# Patient Record
Sex: MALE | Race: WHITE | NOT HISPANIC OR LATINO | Employment: OTHER | URBAN - METROPOLITAN AREA
[De-identification: names, ages, dates, MRNs, and addresses within clinical notes are randomized per-mention and may not be internally consistent; named-entity substitution may affect disease eponyms.]

---

## 2021-11-17 ENCOUNTER — TELEPHONE (OUTPATIENT)
Dept: SURGERY | Facility: CLINIC | Age: 46
End: 2021-11-17
Payer: MEDICARE

## 2021-11-18 ENCOUNTER — TELEPHONE (OUTPATIENT)
Dept: SURGERY | Facility: CLINIC | Age: 46
End: 2021-11-18
Payer: MEDICARE

## 2021-11-23 ENCOUNTER — TELEPHONE (OUTPATIENT)
Dept: SURGERY | Facility: CLINIC | Age: 46
End: 2021-11-23
Payer: MEDICARE

## 2022-03-11 ENCOUNTER — TELEPHONE (OUTPATIENT)
Dept: SURGERY | Facility: CLINIC | Age: 47
End: 2022-03-11
Payer: MEDICARE

## 2022-03-11 NOTE — TELEPHONE ENCOUNTER
Spoke with patient's wife and appointment scheduled for potential ostomy reversal. Appointment details confirmed with patient's wife. Link to [facility] sent via text.

## 2022-03-11 NOTE — TELEPHONE ENCOUNTER
Spoke with wife and instructed to bring most recent CT imaging  disc with her.Wife  States that she will bring CT done in [place] with her to appointment.

## 2022-03-11 NOTE — TELEPHONE ENCOUNTER
----- Message from Maude Tai sent at 3/11/2022  2:01 PM CST -----  Regarding: appt  Contact: Rosanna (wife) @ [phone]  Caller asking to speak with someone in Dr. Lock's office regarding scheduling an appt for the patient, has new insurance ([employer]), please call.

## 2022-03-14 ENCOUNTER — TELEPHONE (OUTPATIENT)
Dept: SURGERY | Facility: CLINIC | Age: 47
End: 2022-03-14
Payer: MEDICARE

## 2022-03-14 NOTE — TELEPHONE ENCOUNTER
----- Message from Chaya Fields sent at 3/14/2022  9:20 AM CDT -----  Regarding: Appointment  Contact: Rosanna/wife [phone]  Calling to get an earlier appointment time on [id number]. Please call

## 2022-03-16 ENCOUNTER — TELEPHONE (OUTPATIENT)
Dept: SURGERY | Facility: CLINIC | Age: 47
End: 2022-03-16
Payer: MEDICARE

## 2022-03-18 ENCOUNTER — OFFICE VISIT (OUTPATIENT)
Dept: SURGERY | Facility: CLINIC | Age: 47
End: 2022-03-18
Payer: MEDICARE

## 2022-03-18 VITALS
HEIGHT: 75 IN | BODY MASS INDEX: 37.88 KG/M2 | HEART RATE: 68 BPM | DIASTOLIC BLOOD PRESSURE: 78 MMHG | SYSTOLIC BLOOD PRESSURE: 135 MMHG | WEIGHT: 304.69 LBS

## 2022-03-18 DIAGNOSIS — Z93.3 COLOSTOMY IN PLACE: ICD-10-CM

## 2022-03-18 DIAGNOSIS — Z85.048 HISTORY OF RECTAL CANCER: Primary | ICD-10-CM

## 2022-03-18 PROCEDURE — 99999 PR PBB SHADOW E&M-EST. PATIENT-LVL III: CPT | Mod: PBBFAC,,, | Performed by: COLON & RECTAL SURGERY

## 2022-03-18 PROCEDURE — 3078F DIAST BP <80 MM HG: CPT | Mod: CPTII,S$GLB,, | Performed by: COLON & RECTAL SURGERY

## 2022-03-18 PROCEDURE — 3075F PR MOST RECENT SYSTOLIC BLOOD PRESS GE 130-139MM HG: ICD-10-PCS | Mod: CPTII,S$GLB,, | Performed by: COLON & RECTAL SURGERY

## 2022-03-18 PROCEDURE — 3008F PR BODY MASS INDEX (BMI) DOCUMENTED: ICD-10-PCS | Mod: CPTII,S$GLB,, | Performed by: COLON & RECTAL SURGERY

## 2022-03-18 PROCEDURE — 99999 PR PBB SHADOW E&M-EST. PATIENT-LVL III: ICD-10-PCS | Mod: PBBFAC,,, | Performed by: COLON & RECTAL SURGERY

## 2022-03-18 PROCEDURE — 3078F PR MOST RECENT DIASTOLIC BLOOD PRESSURE < 80 MM HG: ICD-10-PCS | Mod: CPTII,S$GLB,, | Performed by: COLON & RECTAL SURGERY

## 2022-03-18 PROCEDURE — 99205 PR OFFICE/OUTPT VISIT, NEW, LEVL V, 60-74 MIN: ICD-10-PCS | Mod: S$GLB,,, | Performed by: COLON & RECTAL SURGERY

## 2022-03-18 PROCEDURE — 1160F RVW MEDS BY RX/DR IN RCRD: CPT | Mod: CPTII,S$GLB,, | Performed by: COLON & RECTAL SURGERY

## 2022-03-18 PROCEDURE — 1160F PR REVIEW ALL MEDS BY PRESCRIBER/CLIN PHARMACIST DOCUMENTED: ICD-10-PCS | Mod: CPTII,S$GLB,, | Performed by: COLON & RECTAL SURGERY

## 2022-03-18 PROCEDURE — 1159F MED LIST DOCD IN RCRD: CPT | Mod: CPTII,S$GLB,, | Performed by: COLON & RECTAL SURGERY

## 2022-03-18 PROCEDURE — 1159F PR MEDICATION LIST DOCUMENTED IN MEDICAL RECORD: ICD-10-PCS | Mod: CPTII,S$GLB,, | Performed by: COLON & RECTAL SURGERY

## 2022-03-18 PROCEDURE — 3008F BODY MASS INDEX DOCD: CPT | Mod: CPTII,S$GLB,, | Performed by: COLON & RECTAL SURGERY

## 2022-03-18 PROCEDURE — 3075F SYST BP GE 130 - 139MM HG: CPT | Mod: CPTII,S$GLB,, | Performed by: COLON & RECTAL SURGERY

## 2022-03-18 PROCEDURE — 99205 OFFICE O/P NEW HI 60 MIN: CPT | Mod: S$GLB,,, | Performed by: COLON & RECTAL SURGERY

## 2022-03-18 RX ORDER — PREGABALIN 75 MG/1
75 CAPSULE ORAL
COMMUNITY
Start: 2022-03-17 | End: 2023-03-17

## 2022-03-18 NOTE — PROGRESS NOTES
CRS Office Visit History and Physical    Referring Md:   Kolton Loredo Md  [address] [place] [address] [address]  [place] [id number]    SUBJECTIVE:     Chief Complaint: colostomy reversal    History of Present Illness:  The patient is a new patient to this practice.   Course is as follows:  Patient is a 47 y.o. male presents with colostomy  Diagnosed with rectal cancer in May 2018.  He initially presented with diarrhea.  He was obstructed at the time of his initial colonoscopy and was therefore treated with emergent laparoscopic loop colostomy.  He then underwent neoadjuvant chemo radiotherapy as well as systemic chemotherapy.  He was sent to the [facility] to have a low anterior resection performed.  Robotic low anterior resection was performed 10/2018.  At that time, anastomosis was not performed due to difficulty getting the colon to reach.  He was therefore given an end colostomy with a Saima stump.  He had additional adjuvant chemotherapy.    From a tumor standpoint, he has done well.  He is followed by [facility] for oncology.  Recent tumor markers and CT scans negative for metastatic or recurrent disease.  In March 2021, he developed peristomal hernia.  Laparoscopic parastomal hernia repair was performed.  At that time, his pelvis was noted to be scarred and the rectal stump unable to be identified.  Therefore, keyhole repair with a mesh was done laparoscopically.    He has a history of a prior laparoscopic gastric band with resultant 300 lb weight loss.  He has no open abdominal incisions.  No lower abdominal incisions.  His understanding of his low anterior resection was that the surgeon took out a proximally 5-8 inches of the rectum.    Historically, he had bowel movements twice per day.  No issues with fecal incontinence.  No tobacco abuse.  His weight has been stable over the past several months.    Review of patient's allergies indicates:  No Known Allergies    Past Medical History:   Diagnosis  "Date    Rectal cancer     T3N2M0     Past Surgical History:   Procedure Laterality Date    LAPAROSCOPIC GASTRIC BANDING  10/2009    PARASTOMAL HERNIA REPAIR  03/2021    ROBOT-ASSISTED LOW ANTERIOR RESECTION OF COLON       Family History   Problem Relation Age of Onset    Obesity Mother     Hypertension Father            Review of Systems:  Review of Systems   Constitutional: Negative for chills, diaphoresis, fever, malaise/fatigue and weight loss.   HENT: Negative for congestion.    Respiratory: Negative for shortness of breath.    Cardiovascular: Negative for chest pain and leg swelling.   Gastrointestinal: Negative for abdominal pain, blood in stool, constipation, nausea and vomiting.   Genitourinary: Negative for dysuria.   Musculoskeletal: Negative for back pain and myalgias.   Skin: Negative for rash.   Neurological: Positive for tingling. Negative for dizziness and weakness.   Endo/Heme/Allergies: Does not bruise/bleed easily.   Psychiatric/Behavioral: Negative for depression.       OBJECTIVE:     Vital Signs (Most Recent)  /78 (BP Location: Right arm, Patient Position: Sitting, BP Method: Large (Automatic))   Pulse 68   Ht 6' 3" (1.905 m)   Wt (!) 138.2 kg (304 lb 10.8 oz)   BMI 38.08 kg/m²     Physical Exam:  General: White male in no distress   Neuro: alert and oriented x 4.  Moves all extremities.     HEENT: no icterus.  Trachea midline  Respiratory: respirations are even and unlabored  Cardiac: regular rate  Abdomen:  Moderate pannus.  Ostomy in the left mid abdomen is healthy appearing.  No lower abdominal incisions.  Laparoscopic incisions have all healed well.  Extremities: Warm dry and intact.  No edema  Skin: no rashes  Anorectal:  External exam was normal.  Digital exam performed.  Normal tone.  Normal squeeze pressure.  Top of the stump was not palpated.    Labs:  H&H 16 and 50. Albumin 4.2.  Normal renal function.  CEA from 01/28/2022 <0.5    Imaging:   CT chest abdomen pelvis on " 02/16/2021 personally reviewed demonstrates parastomal hernia.  Rectal stump seen with staple line.  Small bowel loops on top of the rectal stump.  Splenic flexure does not appear to have been previously mobilized.  CT chest abdomen pelvis on 01/28/2022 personally reviewed demonstrates interval repair peristomal hernia.  Stoma is lateral to the rectus in the transversalis.  Stoma appears to be the descending colon.  Splenic flexure remains in its normal anatomic location.  Appears to have adequate length available with splenic flexure mobilization.      ASSESSMENT/PLAN:     Diagnoses and all orders for this visit:    History of rectal cancer  -     Case Request Operating Room: PROCTECTOMY, LAPAROSCOPIC, ERAS low, CLOSURE, COLOSTOMY, SIGMOIDOSCOPY, FLEXIBLE, CREATION, ILEOSTOMY, INSERTION, CATHETER, URETER    Colostomy in place  -     Case Request Operating Room: PROCTECTOMY, LAPAROSCOPIC, ERAS low, CLOSURE, COLOSTOMY, SIGMOIDOSCOPY, FLEXIBLE, CREATION, ILEOSTOMY, INSERTION, CATHETER, URETER        47 y.o. male with history of rectal cancer s/p robotic LAR with inability to get the colon to reach the pelvis resulting in Saima's procedure.  He presents for evaluation for ostomy takedown.  On exam, he has a 7-8 cm rectal stump that appears healthy.  His CT scan demonstrates small bowel loops on top of the rectal stump.  CT shows that he has a descending colostomy.  Splenic flexure appears in its normal anatomic location and could be completely mobilized to increase reach.  He previously was over 500 lb but has lost substantial weight following his gastric banding.  His weight in nutrition are stable.  Functionally, he is normal.  He appears to be a good candidate for ostomy reversal.  I am unclear what attempts were made during his initial resection that prevented anastomosis.    Technically, we will plan for bilateral ureteral catheters given the redo pelvic operation.  We could approach should using a Pfannenstiel  since he has no past lower abdominal incisions.  Through the Pfannenstiel, we will plan to dissect out the rectal stump.  Afterwards, complete mobilization of the splenic flexure would be necessary.  Spoke with him via telephone after reviewing the films.  He would like to proceed in early June.  Discussed with him the risks failure to reach for anastomosis, anastomotic leak, bleeding, hernia, damage to surrounding structure, need for ureteral catheters, 4-5 day postoperative stay and a 6 week total recovery.  Also discussed the temporary fecal diversion with a loop ileostomy would be necessary due to his history of radiation and low pelvic surgery.    We will plan to consent the morning of surgery as well as marked for an ileostomy since he lives 3 hours away.    He is planning on getting a colonoscopy next month.  He will send me the results.    Flexible sigmoidoscopy:  Flexible endoscope was inserted through the anus into the rectal stump.  The rectal stump appeared distensible and measured approximately 7-8 cm.  No sign intraluminal disease.  He tolerated the procedure well.      BROOKLYN Lock MD, FACS, FASCRS  Staff Surgeon  Colon & Rectal Surgery

## 2022-03-22 ENCOUNTER — TELEPHONE (OUTPATIENT)
Dept: SURGERY | Facility: CLINIC | Age: 47
End: 2022-03-22
Payer: MEDICARE

## 2022-03-22 ENCOUNTER — PATIENT MESSAGE (OUTPATIENT)
Dept: SURGERY | Facility: CLINIC | Age: 47
End: 2022-03-22
Payer: MEDICARE

## 2022-03-22 DIAGNOSIS — Z93.3 COLOSTOMY IN PLACE: Primary | ICD-10-CM

## 2022-03-22 RX ORDER — METRONIDAZOLE 500 MG/1
500 TABLET ORAL 3 TIMES DAILY
Qty: 3 TABLET | Refills: 0 | Status: SHIPPED | OUTPATIENT
Start: 2022-03-22 | End: 2022-03-23

## 2022-03-22 RX ORDER — NEOMYCIN SULFATE 500 MG/1
TABLET ORAL
Qty: 6 TABLET | Refills: 0 | Status: SHIPPED | OUTPATIENT
Start: 2022-03-22 | End: 2022-06-08

## 2022-03-22 RX ORDER — POLYETHYLENE GLYCOL 3350 17 G/17G
POWDER, FOR SOLUTION ORAL
Qty: 290 G | Refills: 0 | Status: SHIPPED | OUTPATIENT
Start: 2022-03-22 | End: 2022-06-08

## 2022-03-22 NOTE — TELEPHONE ENCOUNTER
Spoke with patient's wife and number to central pricing office provided per request. No other questions at this time.Wife states that they have received surgery instructions and will call for any clarification, if needed.

## 2022-03-22 NOTE — TELEPHONE ENCOUNTER
----- Message from BROOKLYN Lock MD sent at 3/19/2022  9:52 AM CDT -----  Hi!      I put him on for a colostomy takedown on June 6th.  He will need a full prep.  I also put in for bilateral ureteral catheters with urology.  Will plan to consents the morning of surgery as well as marked for stoma the morning of surgery since he lives 3 hours away.    THANK YOU!    Farhat

## 2022-03-22 NOTE — TELEPHONE ENCOUNTER
----- Message from Mat Sosa sent at 3/22/2022 12:27 PM CDT -----  Contact: Rosanna ( spouse ) @[phone]  Caller requesting a return phone call from Steph about paperwork requested, Please return call to discuss further

## 2022-03-22 NOTE — TELEPHONE ENCOUNTER
Surgery Instructions emailed to [email or web address].  Message sent via portal.

## 2022-03-23 RX ORDER — SODIUM CHLORIDE 9 MG/ML
INJECTION, SOLUTION INTRAVENOUS CONTINUOUS
Status: CANCELLED | OUTPATIENT
Start: 2022-03-23

## 2022-03-23 RX ORDER — GABAPENTIN 300 MG/1
300 CAPSULE ORAL 3 TIMES DAILY
Status: CANCELLED | OUTPATIENT
Start: 2022-03-23

## 2022-04-13 ENCOUNTER — PATIENT MESSAGE (OUTPATIENT)
Dept: SURGERY | Facility: CLINIC | Age: 47
End: 2022-04-13
Payer: MEDICARE

## 2022-05-17 ENCOUNTER — DOCUMENTATION ONLY (OUTPATIENT)
Dept: HEMATOLOGY/ONCOLOGY | Facility: CLINIC | Age: 47
End: 2022-05-17
Payer: MEDICARE

## 2022-05-17 NOTE — PROGRESS NOTES
Social Work Consult    Name:Art Carrillo  : 1975  MRN: [id number]    Referral:[facility]     SW received consult from Steph Ramirez RN via Anatoliy Gonzalez LCSW. Hotel stay needed for upcoming surgery.    SW spoke to patients wife, Rosanna, [phone]. Prior to surgery () patient will need to drink cleansing drink and cannot travel. Rosanna requested stay from 22 until 6/10. Rosanna reports she will pay privately if longer stay is needed but would like to use [employer] for the 6 planned nights.    SW submit request for stay.

## 2022-05-18 ENCOUNTER — DOCUMENTATION ONLY (OUTPATIENT)
Dept: HEMATOLOGY/ONCOLOGY | Facility: CLINIC | Age: 47
End: 2022-05-18
Payer: MEDICARE

## 2022-05-18 ENCOUNTER — TELEPHONE (OUTPATIENT)
Dept: SURGERY | Facility: CLINIC | Age: 47
End: 2022-05-18
Payer: MEDICARE

## 2022-05-18 NOTE — PROGRESS NOTES
JORDYN received [facility] confirmation for the patients family stay from 6/4 until 6/10. Confirmation# [id number].    SW called and spoke to patients wife, Rosanna. Confirmation number provided, answered questions about dining options.     Rosanna had question about how to pay for procedure. JORDYN recommended calling MD office.

## 2022-05-18 NOTE — TELEPHONE ENCOUNTER
Spoke with wife regarding emailing color copies of most recent colonoscopy. Wife confirmed that hotel accommodations were set up. No further needs at this time.

## 2022-05-18 NOTE — TELEPHONE ENCOUNTER
----- Message from Stephen Thompson LCSW sent at 5/18/2022 12:03 PM CDT -----  All set. [facility] has been arranged for 6/4 - 6/10  ----- Message -----  From: Иван Gonzalez LCSW  Sent: 5/17/2022  10:00 AM CDT  To: Stephen Thompson LCSW    Surg Onc patient with qualifying rectal cancer diagnosis; let me know if you need assistance,  Иван  ----- Message -----  From: Steph Ramirez RN  Sent: 5/17/2022   8:35 AM CDT  To: Иван Gonzalez LCSW    Good morning,    Could you possibly help out this patient with hotel accommodations?    He is scheduled for surgery June 6 for rectal cancer surgery.    Thanks,  Steph

## 2022-06-03 ENCOUNTER — TELEPHONE (OUTPATIENT)
Dept: SURGERY | Facility: CLINIC | Age: 47
End: 2022-06-03
Payer: MEDICARE

## 2022-06-03 NOTE — TELEPHONE ENCOUNTER
----- Message from Rosanna Carrillo, Patient Care Assistant sent at 6/3/2022  2:27 PM CDT -----  Regarding: procedure time  Contact: Pt  Pt is requesting a call back in regards to time for his procedure. Pt states she doesn't know what time he needs to be there and       Pt @ [phone] or [phone]

## 2022-06-03 NOTE — PRE-PROCEDURE INSTRUCTIONS
PREOP INSTRUCTIONS:  No food,milk or milk products for 8 hours before surgery.  Clear liquids like water,gatorade,apple juice are allowed up until 2 hours before surgery.  Instructed to follow the surgeon's instructions if they differ from these.  Shower instructions as well as directions to the Surgery Center were given.  Encouraged to wear loose fitting,comfortable clothing.  Medication instructions for pm prior to and am of procedure reviewed.  Instructed to avoid taking vitamins,supplements,aspirin and ibuprofen the morning of surgery.    Patient denies any side effects or issues with anesthesia or sedation other than PONV    Patient does not know arrival time.Explained that this information comes from the surgeon's office and if they haven't heard from them by 3 pm to call the office.Patient stated an understanding.

## 2022-06-06 ENCOUNTER — HOSPITAL ENCOUNTER (INPATIENT)
Facility: HOSPITAL | Age: 47
LOS: 1 days | Discharge: HOME | DRG: 393 | End: 2022-06-06
Attending: COLON & RECTAL SURGERY | Admitting: COLON & RECTAL SURGERY
Payer: MEDICARE

## 2022-06-06 ENCOUNTER — PATIENT MESSAGE (OUTPATIENT)
Dept: SURGERY | Facility: HOSPITAL | Age: 47
End: 2022-06-06
Payer: MEDICARE

## 2022-06-06 VITALS
SYSTOLIC BLOOD PRESSURE: 121 MMHG | BODY MASS INDEX: 37.05 KG/M2 | WEIGHT: 298 LBS | OXYGEN SATURATION: 97 % | TEMPERATURE: 98 F | DIASTOLIC BLOOD PRESSURE: 78 MMHG | HEART RATE: 74 BPM | RESPIRATION RATE: 20 BRPM | HEIGHT: 75 IN

## 2022-06-06 DIAGNOSIS — Z93.3 COLOSTOMY IN PLACE: ICD-10-CM

## 2022-06-06 DIAGNOSIS — Z85.048 HISTORY OF RECTAL CANCER: Primary | ICD-10-CM

## 2022-06-06 DIAGNOSIS — C20 RECTAL CANCER: Primary | ICD-10-CM

## 2022-06-06 LAB
CTP QC/QA: YES
SARS-COV-2 AG RESP QL IA.RAPID: POSITIVE

## 2022-06-06 PROCEDURE — 99499 UNLISTED E&M SERVICE: CPT | Mod: ,,, | Performed by: COLON & RECTAL SURGERY

## 2022-06-06 PROCEDURE — 63600175 PHARM REV CODE 636 W HCPCS: Performed by: NURSE PRACTITIONER

## 2022-06-06 PROCEDURE — 12000002 HC ACUTE/MED SURGE SEMI-PRIVATE ROOM

## 2022-06-06 PROCEDURE — 25000003 PHARM REV CODE 250: Performed by: NURSE PRACTITIONER

## 2022-06-06 PROCEDURE — 99499 NO LOS: ICD-10-PCS | Mod: ,,, | Performed by: COLON & RECTAL SURGERY

## 2022-06-06 RX ORDER — ACETAMINOPHEN 650 MG/20.3ML
975 LIQUID ORAL
Status: COMPLETED | OUTPATIENT
Start: 2022-06-06 | End: 2022-06-06

## 2022-06-06 RX ORDER — SODIUM CHLORIDE 9 MG/ML
INJECTION, SOLUTION INTRAVENOUS
Status: ACTIVE | OUTPATIENT
Start: 2022-06-06

## 2022-06-06 RX ORDER — GABAPENTIN 300 MG/1
300 CAPSULE ORAL
Status: COMPLETED | OUTPATIENT
Start: 2022-06-06 | End: 2022-06-06

## 2022-06-06 RX ORDER — MUPIROCIN 20 MG/G
1 OINTMENT TOPICAL
Status: COMPLETED | OUTPATIENT
Start: 2022-06-06 | End: 2022-06-06

## 2022-06-06 RX ORDER — METRONIDAZOLE 500 MG/100ML
500 INJECTION, SOLUTION INTRAVENOUS
Status: COMPLETED | OUTPATIENT
Start: 2022-06-06 | End: 2022-06-27

## 2022-06-06 RX ORDER — TRIPROLIDINE/PSEUDOEPHEDRINE 2.5MG-60MG
600 TABLET ORAL
Status: COMPLETED | OUTPATIENT
Start: 2022-06-06 | End: 2022-06-06

## 2022-06-06 RX ORDER — LIDOCAINE HYDROCHLORIDE 10 MG/ML
1 INJECTION, SOLUTION EPIDURAL; INFILTRATION; INTRACAUDAL; PERINEURAL
Status: ACTIVE | OUTPATIENT
Start: 2022-06-06

## 2022-06-06 RX ORDER — HEPARIN SODIUM 5000 [USP'U]/ML
5000 INJECTION, SOLUTION INTRAVENOUS; SUBCUTANEOUS EVERY 8 HOURS
Status: COMPLETED | OUTPATIENT
Start: 2022-06-06 | End: 2022-06-06

## 2022-06-06 RX ADMIN — ACETAMINOPHEN 976.6 MG: 160 SOLUTION ORAL at 09:06

## 2022-06-06 RX ADMIN — GABAPENTIN 300 MG: 300 CAPSULE ORAL at 09:06

## 2022-06-06 RX ADMIN — HEPARIN SODIUM 5000 UNITS: 5000 INJECTION INTRAVENOUS; SUBCUTANEOUS at 09:06

## 2022-06-06 RX ADMIN — MUPIROCIN 1 G: 20 OINTMENT TOPICAL at 09:06

## 2022-06-06 RX ADMIN — IBUPROFEN 600 MG: 100 SUSPENSION ORAL at 09:06

## 2022-06-06 NOTE — H&P (VIEW-ONLY)
CRS History and Physical    Referring Md:   BROOKLYN Lock Md  [address] [place] [address]    SUBJECTIVE:     Chief Complaint: colostomy reversal    History of Present Illness:  The patient is a new patient to this practice.   Course is as follows:  Patient is a 47 y.o. male presents with colostomy  Diagnosed with rectal cancer in May 2018.  He initially presented with diarrhea.  He was obstructed at the time of his initial colonoscopy and was therefore treated with emergent laparoscopic loop colostomy.  He then underwent neoadjuvant chemo radiotherapy as well as systemic chemotherapy.  He was sent to the [facility] to have a low anterior resection performed.  Robotic low anterior resection was performed 10/2018.  At that time, anastomosis was not performed due to difficulty getting the colon to reach.  He was therefore given an end colostomy with a Saima stump.  He had additional adjuvant chemotherapy.    From a tumor standpoint, he has done well.  He is followed by [facility] for oncology.  Recent tumor markers and CT scans negative for metastatic or recurrent disease.  In March 2021, he developed peristomal hernia.  Laparoscopic parastomal hernia repair was performed.  At that time, his pelvis was noted to be scarred and the rectal stump unable to be identified.  Therefore, keyhole repair with a mesh was done laparoscopically.    He has a history of a prior laparoscopic gastric band with resultant 300 lb weight loss.  He has no open abdominal incisions.  No lower abdominal incisions.  His understanding of his low anterior resection was that the surgeon took out a proximally 5-8 inches of the rectum.    Historically, he had bowel movements twice per day.  No issues with fecal incontinence.  No tobacco abuse.  His weight has been stable over the past several months.    Review of patient's allergies indicates:  No Known Allergies    Past Medical History:   Diagnosis Date    Rectal  "cancer     T3N2M0     Past Surgical History:   Procedure Laterality Date    LAPAROSCOPIC GASTRIC BANDING  10/2009    PARASTOMAL HERNIA REPAIR  03/2021    ROBOT-ASSISTED LOW ANTERIOR RESECTION OF COLON       Family History   Problem Relation Age of Onset    Obesity Mother     Hypertension Father      Social History     Tobacco Use    Smoking status: Never Smoker    Smokeless tobacco: Never Used        Review of Systems:  Review of Systems   Constitutional: Negative for chills, diaphoresis, fever, malaise/fatigue and weight loss.   HENT: Negative for congestion.    Respiratory: Negative for shortness of breath.    Cardiovascular: Negative for chest pain and leg swelling.   Gastrointestinal: Negative for abdominal pain, blood in stool, constipation, nausea and vomiting.   Genitourinary: Negative for dysuria.   Musculoskeletal: Negative for back pain and myalgias.   Skin: Negative for rash.   Neurological: Positive for tingling. Negative for dizziness and weakness.   Endo/Heme/Allergies: Does not bruise/bleed easily.   Psychiatric/Behavioral: Negative for depression.       OBJECTIVE:     Vital Signs (Most Recent)  /78 (BP Location: Left arm, Patient Position: Lying)   Pulse 74   Temp 97.6 °F (36.4 °C) (Oral)   Resp 20   Ht 6' 3" (1.905 m)   Wt 135.2 kg (298 lb)   SpO2 97%   BMI 37.25 kg/m²     Physical Exam:  General: White male in no distress   Neuro: alert and oriented x 4.  Moves all extremities.     HEENT: no icterus.  Trachea midline  Respiratory: respirations are even and unlabored  Cardiac: regular rate  Abdomen:  Moderate pannus.  Ostomy in the left mid abdomen is healthy appearing.  No lower abdominal incisions.  Laparoscopic incisions have all healed well.  Extremities: Warm dry and intact.  No edema  Skin: no rashes  Anorectal:  External exam was normal.  Digital exam performed.  Normal tone.  Normal squeeze pressure.  Top of the stump was not palpated.    Labs:  H&H 16 and 50. Albumin " 4.2.  Normal renal function.  CEA from 01/28/2022 <0.5    Imaging:   CT chest abdomen pelvis on 02/16/2021 personally reviewed demonstrates parastomal hernia.  Rectal stump seen with staple line.  Small bowel loops on top of the rectal stump.  Splenic flexure does not appear to have been previously mobilized.  CT chest abdomen pelvis on 01/28/2022 personally reviewed demonstrates interval repair peristomal hernia.  Stoma is lateral to the rectus in the transversalis.  Stoma appears to be the descending colon.  Splenic flexure remains in its normal anatomic location.  Appears to have adequate length available with splenic flexure mobilization.      ASSESSMENT/PLAN:     Diagnoses and all orders for this visit:    History of rectal cancer  -     Case Request Operating Room: PROCTECTOMY, LAPAROSCOPIC, ERAS low, CLOSURE, COLOSTOMY, SIGMOIDOSCOPY, FLEXIBLE, CREATION, ILEOSTOMY, INSERTION, CATHETER, URETER    Colostomy in place  -     Case Request Operating Room: PROCTECTOMY, LAPAROSCOPIC, ERAS low, CLOSURE, COLOSTOMY, SIGMOIDOSCOPY, FLEXIBLE, CREATION, ILEOSTOMY, INSERTION, CATHETER, URETER        47 y.o. male with history of rectal cancer s/p robotic LAR with inability to get the colon to reach the pelvis resulting in Saima's procedure.  He presents for evaluation for ostomy takedown.  On exam, he has a 7-8 cm rectal stump that appears healthy.  His CT scan demonstrates small bowel loops on top of the rectal stump.  CT shows that he has a descending colostomy.  Splenic flexure appears in its normal anatomic location and could be completely mobilized to increase reach.  He previously was over 500 lb but has lost substantial weight following his gastric banding.  His weight in nutrition are stable.  Functionally, he is normal.  He appears to be a good candidate for ostomy reversal.  I am unclear what attempts were made during his initial resection that prevented anastomosis.    Technically, we will plan for bilateral  ureteral catheters given the redo pelvic operation.  We could approach should using a Pfannenstiel since he has no past lower abdominal incisions.  Through the Pfannenstiel, we will plan to dissect out the rectal stump.  Afterwards, complete mobilization of the splenic flexure would be necessary.  Spoke with him via telephone after reviewing the films.  He would like to proceed in early June.  Discussed with him the risks failure to reach for anastomosis, anastomotic leak, bleeding, hernia, damage to surrounding structure, need for ureteral catheters, 4-5 day postoperative stay and a 6 week total recovery.  Also discussed the temporary fecal diversion with a loop ileostomy would be necessary due to his history of radiation and low pelvic surgery.    We will plan to consent the morning of surgery as well as marked for an ileostomy since he lives 3 hours away.    He is planning on getting a colonoscopy next month.  He will send me the results.    Flexible sigmoidoscopy:  Flexible endoscope was inserted through the anus into the rectal stump.  The rectal stump appeared distensible and measured approximately 7-8 cm.  No sign intraluminal disease.  He tolerated the procedure well.      BROOKLYN Lock MD, FACS, FASCRS  Staff Surgeon  Colon & Rectal Surgery

## 2022-06-06 NOTE — H&P
CRS History and Physical    Referring Md:   BROOKLYN Lock Md  [address] [place] [address]    SUBJECTIVE:     Chief Complaint: colostomy reversal    History of Present Illness:  The patient is a new patient to this practice.   Course is as follows:  Patient is a 47 y.o. male presents with colostomy  Diagnosed with rectal cancer in May 2018.  He initially presented with diarrhea.  He was obstructed at the time of his initial colonoscopy and was therefore treated with emergent laparoscopic loop colostomy.  He then underwent neoadjuvant chemo radiotherapy as well as systemic chemotherapy.  He was sent to the [facility] to have a low anterior resection performed.  Robotic low anterior resection was performed 10/2018.  At that time, anastomosis was not performed due to difficulty getting the colon to reach.  He was therefore given an end colostomy with a Saima stump.  He had additional adjuvant chemotherapy.    From a tumor standpoint, he has done well.  He is followed by [facility] for oncology.  Recent tumor markers and CT scans negative for metastatic or recurrent disease.  In March 2021, he developed peristomal hernia.  Laparoscopic parastomal hernia repair was performed.  At that time, his pelvis was noted to be scarred and the rectal stump unable to be identified.  Therefore, keyhole repair with a mesh was done laparoscopically.    He has a history of a prior laparoscopic gastric band with resultant 300 lb weight loss.  He has no open abdominal incisions.  No lower abdominal incisions.  His understanding of his low anterior resection was that the surgeon took out a proximally 5-8 inches of the rectum.    Historically, he had bowel movements twice per day.  No issues with fecal incontinence.  No tobacco abuse.  His weight has been stable over the past several months.    Review of patient's allergies indicates:  No Known Allergies    Past Medical History:   Diagnosis Date    Rectal  "cancer     [id number]     Past Surgical History:   Procedure Laterality Date    LAPAROSCOPIC GASTRIC BANDING  10/2009    PARASTOMAL HERNIA REPAIR  03/2021    ROBOT-ASSISTED LOW ANTERIOR RESECTION OF COLON       Family History   Problem Relation Age of Onset    Obesity Mother     Hypertension Father      Social History     Tobacco Use    Smoking status: Never Smoker    Smokeless tobacco: Never Used        Review of Systems:  Review of Systems   Constitutional: Negative for chills, diaphoresis, fever, malaise/fatigue and weight loss.   HENT: Negative for congestion.    Respiratory: Negative for shortness of breath.    Cardiovascular: Negative for chest pain and leg swelling.   Gastrointestinal: Negative for abdominal pain, blood in stool, constipation, nausea and vomiting.   Genitourinary: Negative for dysuria.   Musculoskeletal: Negative for back pain and myalgias.   Skin: Negative for rash.   Neurological: Positive for tingling. Negative for dizziness and weakness.   Endo/Heme/Allergies: Does not bruise/bleed easily.   Psychiatric/Behavioral: Negative for depression.       OBJECTIVE:     Vital Signs (Most Recent)  /78 (BP Location: Left arm, Patient Position: Lying)   Pulse 74   Temp 97.6 °F (36.4 °C) (Oral)   Resp 20   Ht 6' 3" (1.905 m)   Wt 135.2 kg (298 lb)   SpO2 97%   BMI 37.25 kg/m²     Physical Exam:  General: White male in no distress   Neuro: alert and oriented x 4.  Moves all extremities.     HEENT: no icterus.  Trachea midline  Respiratory: respirations are even and unlabored  Cardiac: regular rate  Abdomen:  Moderate pannus.  Ostomy in the left mid abdomen is healthy appearing.  No lower abdominal incisions.  Laparoscopic incisions have all healed well.  Extremities: Warm dry and intact.  No edema  Skin: no rashes  Anorectal:  External exam was normal.  Digital exam performed.  Normal tone.  Normal squeeze pressure.  Top of the stump was not palpated.    Labs:  H&H 16 and 50. Albumin " 4.2.  Normal renal function.  CEA from 01/28/2022 <0.5    Imaging:   CT chest abdomen pelvis on 02/16/2021 personally reviewed demonstrates parastomal hernia.  Rectal stump seen with staple line.  Small bowel loops on top of the rectal stump.  Splenic flexure does not appear to have been previously mobilized.  CT chest abdomen pelvis on 01/28/2022 personally reviewed demonstrates interval repair peristomal hernia.  Stoma is lateral to the rectus in the transversalis.  Stoma appears to be the descending colon.  Splenic flexure remains in its normal anatomic location.  Appears to have adequate length available with splenic flexure mobilization.      ASSESSMENT/PLAN:     Diagnoses and all orders for this visit:    History of rectal cancer  -     Case Request Operating Room: PROCTECTOMY, LAPAROSCOPIC, ERAS low, CLOSURE, COLOSTOMY, SIGMOIDOSCOPY, FLEXIBLE, CREATION, ILEOSTOMY, INSERTION, CATHETER, URETER    Colostomy in place  -     Case Request Operating Room: PROCTECTOMY, LAPAROSCOPIC, ERAS low, CLOSURE, COLOSTOMY, SIGMOIDOSCOPY, FLEXIBLE, CREATION, ILEOSTOMY, INSERTION, CATHETER, URETER        47 y.o. male with history of rectal cancer s/p robotic LAR with inability to get the colon to reach the pelvis resulting in Saima's procedure.  He presents for evaluation for ostomy takedown.  On exam, he has a 7-8 cm rectal stump that appears healthy.  His CT scan demonstrates small bowel loops on top of the rectal stump.  CT shows that he has a descending colostomy.  Splenic flexure appears in its normal anatomic location and could be completely mobilized to increase reach.  He previously was over 500 lb but has lost substantial weight following his gastric banding.  His weight in nutrition are stable.  Functionally, he is normal.  He appears to be a good candidate for ostomy reversal.  I am unclear what attempts were made during his initial resection that prevented anastomosis.    Technically, we will plan for bilateral  ureteral catheters given the redo pelvic operation.  We could approach should using a Pfannenstiel since he has no past lower abdominal incisions.  Through the Pfannenstiel, we will plan to dissect out the rectal stump.  Afterwards, complete mobilization of the splenic flexure would be necessary.  Spoke with him via telephone after reviewing the films.  He would like to proceed in early June.  Discussed with him the risks failure to reach for anastomosis, anastomotic leak, bleeding, hernia, damage to surrounding structure, need for ureteral catheters, 4-5 day postoperative stay and a 6 week total recovery.  Also discussed the temporary fecal diversion with a loop ileostomy would be necessary due to his history of radiation and low pelvic surgery.    We will plan to consent the morning of surgery as well as marked for an ileostomy since he lives 3 hours away.    He is planning on getting a colonoscopy next month.  He will send me the results.    Flexible sigmoidoscopy:  Flexible endoscope was inserted through the anus into the rectal stump.  The rectal stump appeared distensible and measured approximately 7-8 cm.  No sign intraluminal disease.  He tolerated the procedure well.      BROOKLYN Lock MD, FACS, FASCRS  Staff Surgeon  Colon & Rectal Surgery

## 2022-06-06 NOTE — PLAN OF CARE
Preoperative COVID testing positive. Case cancelled. Will plan to reschedule case at the end of this month June 27. Informed consent for procedure, blood obtained in preop. Will scan into chart for surgery date.

## 2022-06-06 NOTE — PROGRESS NOTES
POCT COVID at bedside with positive results. Patient was asymptomatic. Surgery team was notified at the bedside. Case cancelled and rescheduled per MD. IV removed. Wife at bedside and aware of situation. Encouraged patient to call clinic if symptoms arise.

## 2022-06-08 ENCOUNTER — DOCUMENTATION ONLY (OUTPATIENT)
Dept: ONCOLOGY | Facility: HOSPITAL | Age: 47
End: 2022-06-08
Payer: MEDICARE

## 2022-06-08 DIAGNOSIS — Z01.818 PREOP EXAMINATION: Primary | ICD-10-CM

## 2022-06-08 DIAGNOSIS — Z85.048 HISTORY OF RECTAL CANCER: ICD-10-CM

## 2022-06-08 RX ORDER — METRONIDAZOLE 500 MG/1
500 TABLET ORAL 3 TIMES DAILY
Qty: 3 TABLET | Refills: 0 | Status: SHIPPED | OUTPATIENT
Start: 2022-06-08 | End: 2022-06-09

## 2022-06-08 RX ORDER — POLYETHYLENE GLYCOL 3350 17 G/17G
POWDER, FOR SOLUTION ORAL
Qty: 290 G | Refills: 0 | Status: ON HOLD | OUTPATIENT
Start: 2022-06-08 | End: 2022-06-29 | Stop reason: HOSPADM

## 2022-06-08 RX ORDER — NEOMYCIN SULFATE 500 MG/1
TABLET ORAL
Qty: 6 TABLET | Refills: 0 | Status: ON HOLD | OUTPATIENT
Start: 2022-06-08 | End: 2022-06-29 | Stop reason: HOSPADM

## 2022-06-08 NOTE — PROGRESS NOTES
Social Work Consult    Name:Art Carrillo  : 1975  MRN: [id number]    Referral:Toby COBB received consult from Steph Ramirez RN via Иван Gonzalez LCSW. Patient diagnosed with Covid and had to postpone procedure until the , lodging would need to be updated.     JORDYN spoke to patients wife, Rosanna at [phone]. She confirmed new request. Thankfully patient is asymptomatic.     JORDYN adjusted previous housing request and sent to [email or web address]

## 2022-06-23 ENCOUNTER — TELEPHONE (OUTPATIENT)
Dept: SURGERY | Facility: CLINIC | Age: 47
End: 2022-06-23
Payer: MEDICARE

## 2022-06-27 ENCOUNTER — ANESTHESIA EVENT (OUTPATIENT)
Dept: SURGERY | Facility: HOSPITAL | Age: 47
DRG: 331 | End: 2022-06-27
Payer: MEDICARE

## 2022-06-27 ENCOUNTER — ANESTHESIA (OUTPATIENT)
Dept: SURGERY | Facility: HOSPITAL | Age: 47
DRG: 331 | End: 2022-06-27
Payer: MEDICARE

## 2022-06-27 ENCOUNTER — HOSPITAL ENCOUNTER (INPATIENT)
Facility: HOSPITAL | Age: 47
LOS: 2 days | Discharge: HOME | DRG: 331 | End: 2022-06-29
Attending: COLON & RECTAL SURGERY | Admitting: COLON & RECTAL SURGERY
Payer: MEDICARE

## 2022-06-27 DIAGNOSIS — C20 RECTAL CANCER: Primary | ICD-10-CM

## 2022-06-27 LAB
ABO + RH BLD: NORMAL
ANION GAP SERPL CALC-SCNC: 11 MMOL/L (ref 8–16)
BASOPHILS # BLD AUTO: 0.02 K/UL (ref 0–0.2)
BASOPHILS NFR BLD: 0.1 % (ref 0–1.9)
BLD GP AB SCN CELLS X3 SERPL QL: NORMAL
BUN SERPL-MCNC: 12 MG/DL (ref 6–20)
CALCIUM SERPL-MCNC: 7.9 MG/DL (ref 8.7–10.5)
CHLORIDE SERPL-SCNC: 107 MMOL/L (ref 95–110)
CO2 SERPL-SCNC: 20 MMOL/L (ref 23–29)
CREAT SERPL-MCNC: 1.4 MG/DL (ref 0.5–1.4)
DIFFERENTIAL METHOD: ABNORMAL
EOSINOPHIL # BLD AUTO: 0 K/UL (ref 0–0.5)
EOSINOPHIL NFR BLD: 0 % (ref 0–8)
ERYTHROCYTE [DISTWIDTH] IN BLOOD BY AUTOMATED COUNT: 14 % (ref 11.5–14.5)
EST. GFR  (AFRICAN AMERICAN): >60 ML/MIN/1.73 M^2
EST. GFR  (NON AFRICAN AMERICAN): 59.4 ML/MIN/1.73 M^2
GLUCOSE SERPL-MCNC: 154 MG/DL (ref 70–110)
HCT VFR BLD AUTO: 48.8 % (ref 40–54)
HGB BLD-MCNC: 15.9 G/DL (ref 14–18)
IMM GRANULOCYTES # BLD AUTO: 0.1 K/UL (ref 0–0.04)
IMM GRANULOCYTES NFR BLD AUTO: 0.7 % (ref 0–0.5)
LYMPHOCYTES # BLD AUTO: 0.4 K/UL (ref 1–4.8)
LYMPHOCYTES NFR BLD: 3.2 % (ref 18–48)
MAGNESIUM SERPL-MCNC: 2 MG/DL (ref 1.6–2.6)
MCH RBC QN AUTO: 28.3 PG (ref 27–31)
MCHC RBC AUTO-ENTMCNC: 32.6 G/DL (ref 32–36)
MCV RBC AUTO: 87 FL (ref 82–98)
MONOCYTES # BLD AUTO: 0.4 K/UL (ref 0.3–1)
MONOCYTES NFR BLD: 3.1 % (ref 4–15)
NEUTROPHILS # BLD AUTO: 12.9 K/UL (ref 1.8–7.7)
NEUTROPHILS NFR BLD: 92.9 % (ref 38–73)
NRBC BLD-RTO: 0 /100 WBC
PHOSPHATE SERPL-MCNC: 2.9 MG/DL (ref 2.7–4.5)
PLATELET # BLD AUTO: 560 K/UL (ref 150–450)
PMV BLD AUTO: 8.8 FL (ref 9.2–12.9)
POTASSIUM SERPL-SCNC: 4.4 MMOL/L (ref 3.5–5.1)
RBC # BLD AUTO: 5.61 M/UL (ref 4.6–6.2)
SODIUM SERPL-SCNC: 138 MMOL/L (ref 136–145)
WBC # BLD AUTO: 13.84 K/UL (ref 3.9–12.7)

## 2022-06-27 PROCEDURE — 63600175 PHARM REV CODE 636 W HCPCS: Performed by: STUDENT IN AN ORGANIZED HEALTH CARE EDUCATION/TRAINING PROGRAM

## 2022-06-27 PROCEDURE — 27201423 OPTIME MED/SURG SUP & DEVICES STERILE SUPPLY: Performed by: COLON & RECTAL SURGERY

## 2022-06-27 PROCEDURE — 25000003 PHARM REV CODE 250: Performed by: ANESTHESIOLOGY

## 2022-06-27 PROCEDURE — 71000016 HC POSTOP RECOV ADDL HR: Performed by: COLON & RECTAL SURGERY

## 2022-06-27 PROCEDURE — 88304 TISSUE EXAM BY PATHOLOGIST: CPT | Performed by: PATHOLOGY

## 2022-06-27 PROCEDURE — 80048 BASIC METABOLIC PNL TOTAL CA: CPT | Performed by: STUDENT IN AN ORGANIZED HEALTH CARE EDUCATION/TRAINING PROGRAM

## 2022-06-27 PROCEDURE — 88304 PR  SURG PATH,LEVEL III: ICD-10-PCS | Mod: 26,,, | Performed by: PATHOLOGY

## 2022-06-27 PROCEDURE — 86901 BLOOD TYPING SEROLOGIC RH(D): CPT | Performed by: NURSE PRACTITIONER

## 2022-06-27 PROCEDURE — 84100 ASSAY OF PHOSPHORUS: CPT | Performed by: STUDENT IN AN ORGANIZED HEALTH CARE EDUCATION/TRAINING PROGRAM

## 2022-06-27 PROCEDURE — 88307 PR  SURG PATH,LEVEL V: ICD-10-PCS | Mod: 26,,, | Performed by: PATHOLOGY

## 2022-06-27 PROCEDURE — 71000033 HC RECOVERY, INTIAL HOUR: Performed by: COLON & RECTAL SURGERY

## 2022-06-27 PROCEDURE — S0030 INJECTION, METRONIDAZOLE: HCPCS | Performed by: NURSE PRACTITIONER

## 2022-06-27 PROCEDURE — 88307 TISSUE EXAM BY PATHOLOGIST: CPT | Mod: 26,,, | Performed by: PATHOLOGY

## 2022-06-27 PROCEDURE — 88304 TISSUE EXAM BY PATHOLOGIST: CPT | Mod: 26,,, | Performed by: PATHOLOGY

## 2022-06-27 PROCEDURE — 25000003 PHARM REV CODE 250: Performed by: STUDENT IN AN ORGANIZED HEALTH CARE EDUCATION/TRAINING PROGRAM

## 2022-06-27 PROCEDURE — 63600175 PHARM REV CODE 636 W HCPCS: Performed by: NURSE PRACTITIONER

## 2022-06-27 PROCEDURE — 37000009 HC ANESTHESIA EA ADD 15 MINS: Performed by: COLON & RECTAL SURGERY

## 2022-06-27 PROCEDURE — 63600175 PHARM REV CODE 636 W HCPCS: Performed by: NURSE ANESTHETIST, CERTIFIED REGISTERED

## 2022-06-27 PROCEDURE — 88305 TISSUE EXAM BY PATHOLOGIST: CPT | Mod: 59 | Performed by: PATHOLOGY

## 2022-06-27 PROCEDURE — 44227 PR LAP, SURG CLOSE ENTEROSTOMY RESECT ANAST: ICD-10-PCS | Mod: ,,, | Performed by: COLON & RECTAL SURGERY

## 2022-06-27 PROCEDURE — C1729 CATH, DRAINAGE: HCPCS | Performed by: COLON & RECTAL SURGERY

## 2022-06-27 PROCEDURE — 52005 PR CYSTOURETHROSCOPY,URETER CATHETER: ICD-10-PCS | Mod: ,,, | Performed by: UROLOGY

## 2022-06-27 PROCEDURE — 44187 LAP ILEO/JEJUNO-STOMY: CPT | Mod: 51,,, | Performed by: COLON & RECTAL SURGERY

## 2022-06-27 PROCEDURE — 52005 CYSTO W/URTRL CATHJ: CPT | Mod: ,,, | Performed by: UROLOGY

## 2022-06-27 PROCEDURE — 44187 PR LAP, SURG ILEO/JEJUNO-STOMY: ICD-10-PCS | Mod: 51,,, | Performed by: COLON & RECTAL SURGERY

## 2022-06-27 PROCEDURE — 36000711: Performed by: COLON & RECTAL SURGERY

## 2022-06-27 PROCEDURE — 85025 COMPLETE CBC W/AUTO DIFF WBC: CPT | Performed by: STUDENT IN AN ORGANIZED HEALTH CARE EDUCATION/TRAINING PROGRAM

## 2022-06-27 PROCEDURE — 44227 LAP CLOSE ENTEROSTOMY: CPT | Mod: ,,, | Performed by: COLON & RECTAL SURGERY

## 2022-06-27 PROCEDURE — 94761 N-INVAS EAR/PLS OXIMETRY MLT: CPT

## 2022-06-27 PROCEDURE — C1769 GUIDE WIRE: HCPCS | Performed by: COLON & RECTAL SURGERY

## 2022-06-27 PROCEDURE — 71000015 HC POSTOP RECOV 1ST HR: Performed by: COLON & RECTAL SURGERY

## 2022-06-27 PROCEDURE — 25000003 PHARM REV CODE 250: Performed by: NURSE PRACTITIONER

## 2022-06-27 PROCEDURE — 88307 TISSUE EXAM BY PATHOLOGIST: CPT | Performed by: PATHOLOGY

## 2022-06-27 PROCEDURE — 36000710: Performed by: COLON & RECTAL SURGERY

## 2022-06-27 PROCEDURE — 20600001 HC STEP DOWN PRIVATE ROOM

## 2022-06-27 PROCEDURE — 63600175 PHARM REV CODE 636 W HCPCS: Performed by: ANESTHESIOLOGY

## 2022-06-27 PROCEDURE — 25000003 PHARM REV CODE 250: Performed by: NURSE ANESTHETIST, CERTIFIED REGISTERED

## 2022-06-27 PROCEDURE — 37000008 HC ANESTHESIA 1ST 15 MINUTES: Performed by: COLON & RECTAL SURGERY

## 2022-06-27 PROCEDURE — D9220A PRA ANESTHESIA: Mod: ,,, | Performed by: ANESTHESIOLOGY

## 2022-06-27 PROCEDURE — 83735 ASSAY OF MAGNESIUM: CPT | Performed by: STUDENT IN AN ORGANIZED HEALTH CARE EDUCATION/TRAINING PROGRAM

## 2022-06-27 PROCEDURE — 36415 COLL VENOUS BLD VENIPUNCTURE: CPT | Performed by: COLON & RECTAL SURGERY

## 2022-06-27 PROCEDURE — C1765 ADHESION BARRIER: HCPCS | Performed by: COLON & RECTAL SURGERY

## 2022-06-27 PROCEDURE — 86900 BLOOD TYPING SEROLOGIC ABO: CPT | Performed by: NURSE PRACTITIONER

## 2022-06-27 PROCEDURE — D9220A PRA ANESTHESIA: ICD-10-PCS | Mod: ,,, | Performed by: ANESTHESIOLOGY

## 2022-06-27 PROCEDURE — C1758 CATHETER, URETERAL: HCPCS | Performed by: COLON & RECTAL SURGERY

## 2022-06-27 DEVICE — MEMBRANE SEPRAFILM 5 X 6: Type: IMPLANTABLE DEVICE | Site: ABDOMEN | Status: FUNCTIONAL

## 2022-06-27 RX ORDER — SODIUM CHLORIDE 0.9 % (FLUSH) 0.9 %
10 SYRINGE (ML) INJECTION
Status: DISCONTINUED | OUTPATIENT
Start: 2022-06-27 | End: 2022-06-27 | Stop reason: HOSPADM

## 2022-06-27 RX ORDER — PROCHLORPERAZINE EDISYLATE 5 MG/ML
5 INJECTION INTRAMUSCULAR; INTRAVENOUS EVERY 6 HOURS PRN
Status: DISCONTINUED | OUTPATIENT
Start: 2022-06-27 | End: 2022-06-29 | Stop reason: HOSPADM

## 2022-06-27 RX ORDER — PROPOFOL 10 MG/ML
VIAL (ML) INTRAVENOUS
Status: DISCONTINUED | OUTPATIENT
Start: 2022-06-27 | End: 2022-06-27

## 2022-06-27 RX ORDER — ENOXAPARIN SODIUM 100 MG/ML
40 INJECTION SUBCUTANEOUS EVERY 24 HOURS
Status: DISCONTINUED | OUTPATIENT
Start: 2022-06-28 | End: 2022-06-29 | Stop reason: HOSPADM

## 2022-06-27 RX ORDER — DEXAMETHASONE SODIUM PHOSPHATE 4 MG/ML
INJECTION, SOLUTION INTRA-ARTICULAR; INTRALESIONAL; INTRAMUSCULAR; INTRAVENOUS; SOFT TISSUE
Status: DISCONTINUED | OUTPATIENT
Start: 2022-06-27 | End: 2022-06-27

## 2022-06-27 RX ORDER — GABAPENTIN 300 MG/1
300 CAPSULE ORAL
Status: COMPLETED | OUTPATIENT
Start: 2022-06-27 | End: 2022-06-27

## 2022-06-27 RX ORDER — ACETAMINOPHEN 500 MG
1000 TABLET ORAL EVERY 8 HOURS
Status: DISCONTINUED | OUTPATIENT
Start: 2022-06-28 | End: 2022-06-29 | Stop reason: HOSPADM

## 2022-06-27 RX ORDER — LIDOCAINE HYDROCHLORIDE 20 MG/ML
INJECTION INTRAVENOUS
Status: DISCONTINUED | OUTPATIENT
Start: 2022-06-27 | End: 2022-06-27

## 2022-06-27 RX ORDER — MUPIROCIN 20 MG/G
OINTMENT TOPICAL 2 TIMES DAILY
Status: DISCONTINUED | OUTPATIENT
Start: 2022-06-27 | End: 2022-06-29 | Stop reason: HOSPADM

## 2022-06-27 RX ORDER — VECURONIUM BROMIDE FOR INJECTION 1 MG/ML
INJECTION, POWDER, LYOPHILIZED, FOR SOLUTION INTRAVENOUS
Status: DISCONTINUED | OUTPATIENT
Start: 2022-06-27 | End: 2022-06-27

## 2022-06-27 RX ORDER — OXYCODONE HYDROCHLORIDE 10 MG/1
10 TABLET ORAL EVERY 4 HOURS PRN
Status: DISCONTINUED | OUTPATIENT
Start: 2022-06-27 | End: 2022-06-29 | Stop reason: HOSPADM

## 2022-06-27 RX ORDER — SUCCINYLCHOLINE CHLORIDE 20 MG/ML
INJECTION INTRAMUSCULAR; INTRAVENOUS
Status: DISCONTINUED | OUTPATIENT
Start: 2022-06-27 | End: 2022-06-27

## 2022-06-27 RX ORDER — MUPIROCIN 20 MG/G
1 OINTMENT TOPICAL
Status: COMPLETED | OUTPATIENT
Start: 2022-06-27 | End: 2022-06-27

## 2022-06-27 RX ORDER — LIDOCAINE HYDROCHLORIDE 10 MG/ML
1 INJECTION, SOLUTION EPIDURAL; INFILTRATION; INTRACAUDAL; PERINEURAL
Status: DISCONTINUED | OUTPATIENT
Start: 2022-06-27 | End: 2022-06-27

## 2022-06-27 RX ORDER — SODIUM CHLORIDE 9 MG/ML
INJECTION, SOLUTION INTRAVENOUS CONTINUOUS
Status: DISCONTINUED | OUTPATIENT
Start: 2022-06-27 | End: 2022-06-28

## 2022-06-27 RX ORDER — HYDROMORPHONE HYDROCHLORIDE 1 MG/ML
0.2 INJECTION, SOLUTION INTRAMUSCULAR; INTRAVENOUS; SUBCUTANEOUS EVERY 5 MIN PRN
Status: DISCONTINUED | OUTPATIENT
Start: 2022-06-27 | End: 2022-06-27 | Stop reason: HOSPADM

## 2022-06-27 RX ORDER — NEOSTIGMINE METHYLSULFATE 0.5 MG/ML
INJECTION, SOLUTION INTRAVENOUS
Status: DISCONTINUED | OUTPATIENT
Start: 2022-06-27 | End: 2022-06-27

## 2022-06-27 RX ORDER — ACETAMINOPHEN 650 MG/20.3ML
975 LIQUID ORAL
Status: COMPLETED | OUTPATIENT
Start: 2022-06-27 | End: 2022-06-27

## 2022-06-27 RX ORDER — ONDANSETRON 2 MG/ML
INJECTION INTRAMUSCULAR; INTRAVENOUS
Status: DISCONTINUED | OUTPATIENT
Start: 2022-06-27 | End: 2022-06-27

## 2022-06-27 RX ORDER — SODIUM CHLORIDE 0.9 % (FLUSH) 0.9 %
10 SYRINGE (ML) INJECTION
Status: DISCONTINUED | OUTPATIENT
Start: 2022-06-27 | End: 2022-06-29 | Stop reason: HOSPADM

## 2022-06-27 RX ORDER — ACETAMINOPHEN 10 MG/ML
INJECTION, SOLUTION INTRAVENOUS
Status: DISCONTINUED | OUTPATIENT
Start: 2022-06-27 | End: 2022-06-27

## 2022-06-27 RX ORDER — ACETAMINOPHEN 10 MG/ML
1000 INJECTION, SOLUTION INTRAVENOUS EVERY 8 HOURS
Status: COMPLETED | OUTPATIENT
Start: 2022-06-27 | End: 2022-06-28

## 2022-06-27 RX ORDER — ONDANSETRON 2 MG/ML
4 INJECTION INTRAMUSCULAR; INTRAVENOUS EVERY 12 HOURS PRN
Status: DISCONTINUED | OUTPATIENT
Start: 2022-06-27 | End: 2022-06-29 | Stop reason: HOSPADM

## 2022-06-27 RX ORDER — HEPARIN SODIUM 5000 [USP'U]/ML
5000 INJECTION, SOLUTION INTRAVENOUS; SUBCUTANEOUS EVERY 8 HOURS
Status: COMPLETED | OUTPATIENT
Start: 2022-06-27 | End: 2022-06-27

## 2022-06-27 RX ORDER — TRAMADOL HYDROCHLORIDE 50 MG/1
50 TABLET ORAL EVERY 6 HOURS PRN
Status: DISCONTINUED | OUTPATIENT
Start: 2022-06-27 | End: 2022-06-29 | Stop reason: HOSPADM

## 2022-06-27 RX ORDER — OXYCODONE HYDROCHLORIDE 5 MG/1
5 TABLET ORAL EVERY 6 HOURS PRN
Status: DISCONTINUED | OUTPATIENT
Start: 2022-06-27 | End: 2022-06-28

## 2022-06-27 RX ORDER — ROCURONIUM BROMIDE 10 MG/ML
INJECTION, SOLUTION INTRAVENOUS
Status: DISCONTINUED | OUTPATIENT
Start: 2022-06-27 | End: 2022-06-27

## 2022-06-27 RX ORDER — SODIUM CHLORIDE 9 MG/ML
INJECTION, SOLUTION INTRAVENOUS
Status: COMPLETED | OUTPATIENT
Start: 2022-06-27 | End: 2022-06-27

## 2022-06-27 RX ORDER — SODIUM CHLORIDE 9 MG/ML
INJECTION, SOLUTION INTRAVENOUS CONTINUOUS
Status: DISCONTINUED | OUTPATIENT
Start: 2022-06-27 | End: 2022-06-29 | Stop reason: HOSPADM

## 2022-06-27 RX ORDER — KETAMINE HCL IN 0.9 % NACL 50 MG/5 ML
SYRINGE (ML) INTRAVENOUS
Status: DISCONTINUED | OUTPATIENT
Start: 2022-06-27 | End: 2022-06-27

## 2022-06-27 RX ORDER — METRONIDAZOLE 500 MG/100ML
500 INJECTION, SOLUTION INTRAVENOUS
Status: DISCONTINUED | OUTPATIENT
Start: 2022-06-27 | End: 2022-06-27

## 2022-06-27 RX ORDER — IBUPROFEN 400 MG/1
800 TABLET ORAL EVERY 8 HOURS
Status: DISCONTINUED | OUTPATIENT
Start: 2022-06-28 | End: 2022-06-29 | Stop reason: HOSPADM

## 2022-06-27 RX ORDER — LIDOCAINE HYDROCHLORIDE ANHYDROUS AND DEXTROSE MONOHYDRATE .8; 5 G/100ML; G/100ML
INJECTION, SOLUTION INTRAVENOUS CONTINUOUS PRN
Status: DISCONTINUED | OUTPATIENT
Start: 2022-06-27 | End: 2022-06-27

## 2022-06-27 RX ORDER — GABAPENTIN 300 MG/1
300 CAPSULE ORAL 3 TIMES DAILY
Status: DISCONTINUED | OUTPATIENT
Start: 2022-06-27 | End: 2022-06-27

## 2022-06-27 RX ORDER — GABAPENTIN 300 MG/1
300 CAPSULE ORAL 3 TIMES DAILY
Status: DISCONTINUED | OUTPATIENT
Start: 2022-06-27 | End: 2022-06-29 | Stop reason: HOSPADM

## 2022-06-27 RX ORDER — FENTANYL CITRATE 50 UG/ML
INJECTION, SOLUTION INTRAMUSCULAR; INTRAVENOUS
Status: DISCONTINUED | OUTPATIENT
Start: 2022-06-27 | End: 2022-06-27

## 2022-06-27 RX ORDER — TRIPROLIDINE/PSEUDOEPHEDRINE 2.5MG-60MG
600 TABLET ORAL
Status: COMPLETED | OUTPATIENT
Start: 2022-06-27 | End: 2022-06-27

## 2022-06-27 RX ADMIN — VECURONIUM BROMIDE FOR INJECTION 3 MG: 1 INJECTION, POWDER, LYOPHILIZED, FOR SOLUTION INTRAVENOUS at 10:06

## 2022-06-27 RX ADMIN — MIDAZOLAM HYDROCHLORIDE 2 MG: 1 INJECTION, SOLUTION INTRAMUSCULAR; INTRAVENOUS at 09:06

## 2022-06-27 RX ADMIN — NEOSTIGMINE METHYLSULFATE 5 MG: 0.5 INJECTION, SOLUTION INTRAVENOUS at 03:06

## 2022-06-27 RX ADMIN — Medication 20 MG: at 11:06

## 2022-06-27 RX ADMIN — SUCCINYLCHOLINE CHLORIDE 100 MG: 20 INJECTION, SOLUTION INTRAMUSCULAR; INTRAVENOUS; PARENTERAL at 09:06

## 2022-06-27 RX ADMIN — ROCURONIUM BROMIDE 40 MG: 10 INJECTION INTRAVENOUS at 10:06

## 2022-06-27 RX ADMIN — Medication 30 MG: at 09:06

## 2022-06-27 RX ADMIN — Medication 20 MG: at 12:06

## 2022-06-27 RX ADMIN — GLYCOPYRROLATE 0.4 MG: 0.2 INJECTION INTRAMUSCULAR; INTRAVENOUS at 03:06

## 2022-06-27 RX ADMIN — DEXAMETHASONE SODIUM PHOSPHATE 8 MG: 4 INJECTION, SOLUTION INTRAMUSCULAR; INTRAVENOUS at 09:06

## 2022-06-27 RX ADMIN — METRONIDAZOLE 500 MG: 500 INJECTION, SOLUTION INTRAVENOUS at 10:06

## 2022-06-27 RX ADMIN — PROPOFOL 150 MG: 10 INJECTION, EMULSION INTRAVENOUS at 09:06

## 2022-06-27 RX ADMIN — GABAPENTIN 300 MG: 300 CAPSULE ORAL at 09:06

## 2022-06-27 RX ADMIN — MUPIROCIN 1 G: 20 OINTMENT TOPICAL at 08:06

## 2022-06-27 RX ADMIN — HYDROMORPHONE HYDROCHLORIDE 0.2 MG: 1 INJECTION, SOLUTION INTRAMUSCULAR; INTRAVENOUS; SUBCUTANEOUS at 04:06

## 2022-06-27 RX ADMIN — Medication 10 MG: at 02:06

## 2022-06-27 RX ADMIN — MUPIROCIN: 20 OINTMENT TOPICAL at 08:06

## 2022-06-27 RX ADMIN — ACETAMINOPHEN 1000 MG: 10 INJECTION INTRAVENOUS at 02:06

## 2022-06-27 RX ADMIN — LIDOCAINE HYDROCHLORIDE 100 MG: 20 INJECTION, SOLUTION INTRAVENOUS at 09:06

## 2022-06-27 RX ADMIN — IBUPROFEN 800 MG: 800 INJECTION INTRAVENOUS at 09:06

## 2022-06-27 RX ADMIN — FENTANYL CITRATE 100 MCG: 50 INJECTION INTRAMUSCULAR; INTRAVENOUS at 09:06

## 2022-06-27 RX ADMIN — VECURONIUM BROMIDE FOR INJECTION 2 MG: 1 INJECTION, POWDER, LYOPHILIZED, FOR SOLUTION INTRAVENOUS at 11:06

## 2022-06-27 RX ADMIN — ACETAMINOPHEN 1000 MG: 10 INJECTION INTRAVENOUS at 09:06

## 2022-06-27 RX ADMIN — VECURONIUM BROMIDE FOR INJECTION 3 MG: 1 INJECTION, POWDER, LYOPHILIZED, FOR SOLUTION INTRAVENOUS at 12:06

## 2022-06-27 RX ADMIN — ACETAMINOPHEN 976.6 MG: 160 SOLUTION ORAL at 08:06

## 2022-06-27 RX ADMIN — HEPARIN SODIUM 5000 UNITS: 5000 INJECTION INTRAVENOUS; SUBCUTANEOUS at 08:06

## 2022-06-27 RX ADMIN — VECURONIUM BROMIDE FOR INJECTION 2 MG: 1 INJECTION, POWDER, LYOPHILIZED, FOR SOLUTION INTRAVENOUS at 01:06

## 2022-06-27 RX ADMIN — SODIUM CHLORIDE: 0.9 INJECTION, SOLUTION INTRAVENOUS at 03:06

## 2022-06-27 RX ADMIN — GABAPENTIN 300 MG: 300 CAPSULE ORAL at 08:06

## 2022-06-27 RX ADMIN — SODIUM CHLORIDE, SODIUM GLUCONATE, SODIUM ACETATE, POTASSIUM CHLORIDE, MAGNESIUM CHLORIDE, SODIUM PHOSPHATE, DIBASIC, AND POTASSIUM PHOSPHATE: .53; .5; .37; .037; .03; .012; .00082 INJECTION, SOLUTION INTRAVENOUS at 10:06

## 2022-06-27 RX ADMIN — PROCHLORPERAZINE EDISYLATE 5 MG: 5 INJECTION INTRAMUSCULAR; INTRAVENOUS at 04:06

## 2022-06-27 RX ADMIN — ONDANSETRON 4 MG: 2 INJECTION INTRAMUSCULAR; INTRAVENOUS at 04:06

## 2022-06-27 RX ADMIN — IBUPROFEN 600 MG: 100 SUSPENSION ORAL at 08:06

## 2022-06-27 RX ADMIN — ONDANSETRON 4 MG: 2 INJECTION INTRAMUSCULAR; INTRAVENOUS at 03:06

## 2022-06-27 RX ADMIN — CEFTRIAXONE SODIUM 2 G: 2 INJECTION, SOLUTION INTRAVENOUS at 10:06

## 2022-06-27 RX ADMIN — LIDOCAINE HYDROCHLORIDE 0.02 MG/KG/MIN: 8 INJECTION, SOLUTION INTRAVENOUS at 10:06

## 2022-06-27 RX ADMIN — Medication 20 MG: at 01:06

## 2022-06-27 RX ADMIN — SODIUM CHLORIDE: 0.9 INJECTION, SOLUTION INTRAVENOUS at 08:06

## 2022-06-27 RX ADMIN — ROCURONIUM BROMIDE 10 MG: 10 INJECTION INTRAVENOUS at 09:06

## 2022-06-27 RX ADMIN — SODIUM CHLORIDE: 0.9 INJECTION, SOLUTION INTRAVENOUS at 09:06

## 2022-06-27 NOTE — ANESTHESIA PREPROCEDURE EVALUATION
06/27/2022  Art Carrillo is a 47 y.o., male.   extensive hx of rectal CA, s/p mult surgeries without anesthetic complications    Pre-op Assessment    I have reviewed the Patient Summary Reports.     I have reviewed the Nursing Notes. I have reviewed the NPO Status.      Review of Systems  Anesthesia Hx:  No problems with previous Anesthesia  History of prior surgery of interest to airway management or planning: Previous anesthesia: General Denies Family Hx of Anesthesia complications.   Denies Personal Hx of Anesthesia complications.   Hematology/Oncology:  Hematology Normal       Rectal Current/Recent Cancer. Other (see Oncology comments)   Cardiovascular:   Exercise tolerance: good    Pulmonary:  Pulmonary Normal    Renal/:  Renal/ Normal     Musculoskeletal:  Musculoskeletal Normal    Neurological:  Neurology Normal    Endocrine:  Endocrine Normal    Dermatological:  Skin Normal        Physical Exam  General: Well nourished and Cooperative    Airway:  Mallampati: I / I  Mouth Opening: Normal  TM Distance: Normal  Tongue: Normal  Neck ROM: Normal ROM    Dental:  Intact    Chest/Lungs:  Clear to auscultation        Anesthesia Plan  Type of Anesthesia, risks & benefits discussed:    Anesthesia Type: Gen ETT  Intra-op Monitoring Plan: Standard ASA Monitors  Post Op Pain Control Plan: multimodal analgesia  Induction:  IV  Airway Plan: Direct  Informed Consent: Informed consent signed with the Patient and all parties understand the risks and agree with anesthesia plan.  All questions answered. Patient consented to blood products? No  ASA Score: 3  Day of Surgery Review of History & Physical: H&P Update referred to the surgeon/provider.    Ready For Surgery From Anesthesia Perspective.     .

## 2022-06-27 NOTE — PROGRESS NOTES
Aquiles KUMAR notified about moderate amount of serosanguineous drainage to colostomy closure site. MD okay with it. States can reinforce or change dressing as needed. WCTM.

## 2022-06-27 NOTE — OP NOTE
[facility]  Operative Note    Date: 06/27/2022    Pre-Op Diagnosis:   Patient Active Problem List    Diagnosis Date Noted    Rectal cancer         Post-Op Diagnosis: Same     Procedure(s) Performed:   1.  Cystoscopy with bilateral ureteral catheter placement    Specimen(s): none    Staff Surgeon: Edgardo Rdz MD    Assistant Surgeon: Evan Hernandez MD    Anesthesia: General endotracheal anesthesia    Indications: Art Carrillo is a 47 y.o. male with history of rectal cancer.  Dr. Lock has requested intra-operative ureteral catheters to allow for early intra-operative identification and repair of any injuries.      Findings: Normal bladder mucosa. No tumors or masses present. Bilateral UO  in correct anatomical position. B/L UO slightly stenotic    Estimated Blood Loss: min    Drains:   1.  Bilateral 5 Fr ureteral catheters  2.  16 Fr espinosa catheter    Procedure in Detail: Upon entering the room the patient was under general anesthesia.  The patient was then placed in the dorsal lithotomy position and prepped and draped in the usual sterile fashion. Preoperative antibiotics were administered per the primary surgeon preference.  Timeout was performed.      A 22 Fr cystoscope was inserted into the urethra and formal cystourethroscopy was performed. The urethra was normal.  The right and left ureteral orifices were in the normal anatomic position.  There were no mucosal abnormalities. An angled motion wire was placed through a  5 Fr ureteral catheter was then inserted into the right ureteral orifice and advanced up to the level of the renal pelvis. Angled motion wire was removed. The cystoscope was then removed leaving the ureteral catheter in place.     The cystoscope was then reinserted alongside the ureteral catheter and an angled motion wire was placed through a 5 Fr ureteral catheter was advanced into the left ureteral orifice and advanced to the level of the left renal pelvis. The  cystoscope was then removed, leaving the ureteral catheter in place.    A 16 Fr espinosa catheter was inserted and the balloon was filled with 10mL of sterile water. The ureteral catheters were secured to the Espinosa catheter in the standard fashion. There were no complications with the procedure and the patient tolerated our procedure well.     The case was then turned over to the primary surgeon.     Evan Hernandez MD

## 2022-06-27 NOTE — NURSING TRANSFER
Nursing Transfer Note      6/27/2022     Reason patient is being transferred: postop    Transfer To: 1009    Transfer via bed    Transfer with iv pole/fluids    Transported by PCT    Medicines sent: n/a    Any special needs or follow-up needed: routine    Chart send with patient: Yes    Notified: spouse    Patient reassessed at: 6/27 9851

## 2022-06-27 NOTE — OP NOTE
OPERATIVE NOTE:    ANITA WATERS  [id number]    DATE OF PROCEDURE:   06/27/2022     PREOPERATIVE DIAGNOSIS:  History of rectal cancer status post low anterior resection with end colostomy.  Peristomal hernia     POSTOPERATIVE DIAGNOSIS:  History of rectal cancer status post low anterior resection with end colostomy.  Peristomal hernia     PROCEDURES PERFORMED:  1.  Laparoscopic colostomy takedown with splenic flexure mobilization and stapled end-to-side colorectal anastomosis  2.  Redo pelvic dissection  3.  Laparoscopic omentectomy  4.  Diverting loop ileostomy.  5.  Flexible sigmoidoscopy.     ATTENDING SURGEON:  Nathanael Lock M.D.     FELLOW:  Rosanna Kawn MD     ANESTHESIA:  General.     ESTIMATED BLOOD LOSS:  100 mL.     FINDINGS:  1.  Minimal small bowel adhesions into the pelvis.  Bladder draped over the top of 6 cm rectal stump.  Rectal stump dissected free anteriorly.  Left side of the staple line densely tethered to the presacral fascia.  Cleared the right side of the staple line to perform a stapled end-to-side colorectal anastomosis.  2. Small parastomal hernia with associated mesh with multiple small bowel adhesions that were taken down sharply.  3. Multiple omental adhesions.  Omentectomy was performed.  4. In order to get the colostomy to reach the pelvis, complete mobilization of the splenic flexure was performed.  High ligation of the inferior mesenteric vein was performed.  The left colic artery was divided.  Into the colostomy was resected.  A stapled end-to-side Handley was performed.  Negative leak test.  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    COMPLICATIONS:  None apparent.     SPECIMENS:  1.  Colostomy  2.  Proximal anastomotic donut.  3.  Distal anastomotic donut.     DRAINS:  None.     DISPOSITION:  PACU.     INDICATIONS:   47 y.o. year-old male with history of rectal cancer status post low anterior resection in 2018. At  that time, colorectal anastomosis was unable to be performed secondary to inadequate length.  He then developed a peristomal hernia that was repaired.  At that time, the pelvis was unable to be read dissected.  He presented for evaluation for colostomy takedown.  He appeared to have adequate length as well as a small rectal stump.  Colostomy takedown was therefore offered along with fecal diversion secondary to past radiation.     DESCRIPTION OF PROCEDURE:    After informed consent was reviewed, the patient was taken to the Operating Room and placed under general anesthesia.  Urology performed bilateral ureteral catheter placement as well as sterile placement of a Polo catheter.   Ceftriaxone and Flagyl were given for preoperative antibiotics.  The arms were appropriately padded and tucked and the patient was placed into the lithotomy position.  After prepping and draping, a timeout was performed.  An 8 cm Pfannenstiel incision was made two fingerbreadths above the pubic symphysis for placement of a Gelport.  The skin was incised with a knife down to the anterior rectus fascia.  The anterior rectus fascia was incised in transverse fashion and elevated up to the umbilicus and down to the pubic symphysis.  The rectus muscles were then split and the abdomen was entered sharply with care to avoid injury to the underlying bowel.  The sleeve of the Gelport was then placed.  Three 5 mm trocars were then placed with one in the left lower quadrant, one in the right lower quadrant, one just above the umbilicus.    Through the sleeve of the GelPort, the small bowel was packed out of the pelvis.  There were no adhesions of the small bowel to the sacral promontory or to the prior pelvic dissection.  The sacral hollow was empty.  The bladder had folded on top of rectum down into the pelvis.  The bladder was grasped and elevated.  Bilateral ureteral catheters were seen.  The rectum was palpated low in the pelvis.  A mixture of  blunt sharp dissection was used after placement of rectal sizers in order to better identify the rectal stump.  The left side of the rectal stump was densely adherent to the presacral fascia.  The right side was able to be freed.  Mesorectum was cleared.  It appeared suitable for a stapled anastomosis.  The Gelport was occluded and the abdomen was insufflated.  Diagnostic laparoscopy was performed.  Multiple adhesions of the small bowel up to the anterior abdominal wall at the site of the prior keyhole parastomal hernia repair.  The small bowel adhesions were taken down sharply.  Multiple interloop adhesions were divided.  The omentum was adhesed to the small bowel as well as to the colon.  This was divided.  The ligament of Treitz was identified.  The mesentery was incised just lateral to the ligament of Treitz and the retroperitoneum was identified swept posteriorly.  The ureteral catheters were palpated and swept down words.  Dissection continued out laterally to the abdominal sidewall as well as superiorly up above the pancreas.  The omentum was then lifted off of the transverse colon.  The lesser sac was entered.  Omental attachments were divided out towards the splenic flexure.  Lateral to the colostomy, small amount of descending colon was mobilized.  The splenic flexure was rolled medially and completely mobilized.  The attachments to the inferior border the pancreas were divided.  The IMV was then isolated at the level of the pancreas and divided.  The left colic artery remained intact.  After dissection of the omentum, it appeared to have poor blood supply.  Therefore, the omentum was taken off the greater curve of the stomach and a complete omentectomy was performed.    The colostomy was then taken down.  Sterile saline was injected the mucocutaneous junction.  Mucocutaneous junction was incised electrocautery and sharp dissection was used to come down through the peristomal hernia.  There was no injury  to the colon during dissection.  Dissection was challenging secondary to the significant amount fat tissue on the anterior abdominal wall.  The colon was brought back into the abdomen.  The colostomy site was occluded with a small Abel.  Remaining attachments were taken down after repeat pneumoperitoneum was achieved.  The cap of the GelPort was then removed.  The colon was evaluated for length and appeared inadequate to allow for a stapled low colorectal anastomosis.  Therefore, repeat pneumoperitoneum was achieved in the left colic artery was divided with the ligature.  With this, there was excellent laxity.  Approximately 6 cm proximal to the colostomy, the colon was isolated.  The marginal artery was tested and found to be pulsatile.  The mesentery was divided using the ligature.  A colotomy was made distal to the site of transection.  A 29 EEA anvil was inserted and brought out the anti mesenteric side approximately 7 cm proximal to the intended site of transection.  A 3-0 PDS pursestring was placed around the anvil.  The colon was then divided with a CHICHI 75 mm stapler with a blue load in the colostomy was sent to pathology.  The staple line was imbricated with 3-0 Vicryl in Lembert fashion.  A 29 EEA stapler was inserted into the anus and effaced against the rectal stump.  The spike was deployed under manual guidance.  The anvil was secured to the spike and closed with care to avoid entrapment of the bladder and the ureters.  The stapler was fired in 2 circumferential anastomotic donuts were removed.  Leak testing was then performed with a flexible endoscope.  The anastomosis appeared circumferentially healthy.  No leak was seen after insufflation.  The abdomen was once again reinsufflated.  The colon mesentery was straight with no twisting.  The small bowel was evaluated.  No injury to the small bowel or the colon was seen.  Hemostasis was assured.  The terminal ileum was then grasped.  Approximately 20 cm  proximal to the ileocecal valve, the ileum was marked with proximal and distal sutures.  A quarter-inch Penrose drain was used.   In a spot previously chosen on the right mid abdomen, circular incision was made in the skin.  The anterior and posterior rectus fascia were incised in longitudinal fashion.  The ileum was wrapped in Seprafilm and brought through the defect to sit above the level of the skin with no tension.  The prior colostomy site in the left upper abdomen was then addressed.  The fascia was grasped and cleared circumferentially.  The small Abel was used to retract the subcutaneous adipose tissue.  The anterior rectus fascia was reapproximated with interrupted 0 PDS figure-of-eight sutures.    All members of the team then changed gowns and gloves.  New instruments were then used for abdominal closure.  The abdomen was then closed.  At the Pfannenstiel incision, The posterior fascia and peritoneum were closed with a #1 running PDS.  Simple #1 PDS sutures were placed in the muscle to reapproximate the muscle and prevent diastasis.  The anterior rectus fascia was closed with a #1 running PDS as well.  All skin incisions were closed with 4-0 Vicryl in subcuticular fashion.  Steri-Strips were applied.  The prior colostomy site was closed with a 3-0 PDS pursestring suture and packed with a sterile gauze.  Lastly, the ileostomy was matured.  The Penrose drain was exchanged for an 18 Setswana red rubber catheter to be used as a ileostomy gloria.   The terminal ileum was incised.  The distal limb was secured to the skin with three interrupted 3-0 Vicryl sutures in simple fashion.  The proximal limb was brooked to sit approximately 1.5 cm above the level of the skin using five 3-0 Vicryl sutures.  Ileostomy appliance was brought into the field, cut to the appropriate size, and applied.   The patient tolerated the procedure well.  There were no apparent intraoperative complications.  All sponge, needle, and  instrument counts were correct x2.   The patient was extubated and taken to PACU in stable condition.    BROOKLYN Lock MD, FACS  Staff Surgeon  Colon & Rectal Surgery

## 2022-06-27 NOTE — ANESTHESIA PROCEDURE NOTES
Intubation    Date/Time: 6/27/2022 8:29 AM  Performed by: Nathanael Langley MD  Authorized by: Nathanael Langley MD     Intubation:     Induction:  Intravenous    Intubated:  Postinduction    Mask Ventilation:  Easy mask    Attempts:  1    Attempted By:  Staff anesthesiologist    Method of Intubation:  Direct    Blade:  Martell 2    Laryngeal View Grade: Grade I - full view of cords      Difficult Airway Encountered?: No      Complications:  None    Airway Device:  Oral endotracheal tube    Airway Device Size:  7.5    Style/Cuff Inflation:  Cuffed    Inflation Amount (mL):  10    Tube secured:  24    Secured at:  The lips    Placement Verified By:  Capnometry    Complicating Factors:  None    Findings Post-Intubation:  BS equal bilateral

## 2022-06-28 LAB
ANION GAP SERPL CALC-SCNC: 11 MMOL/L (ref 8–16)
BASOPHILS # BLD AUTO: 0.02 K/UL (ref 0–0.2)
BASOPHILS NFR BLD: 0.2 % (ref 0–1.9)
BUN SERPL-MCNC: 17 MG/DL (ref 6–20)
CALCIUM SERPL-MCNC: 8.2 MG/DL (ref 8.7–10.5)
CHLORIDE SERPL-SCNC: 109 MMOL/L (ref 95–110)
CO2 SERPL-SCNC: 17 MMOL/L (ref 23–29)
CREAT SERPL-MCNC: 1.6 MG/DL (ref 0.5–1.4)
DIFFERENTIAL METHOD: ABNORMAL
EOSINOPHIL # BLD AUTO: 0 K/UL (ref 0–0.5)
EOSINOPHIL NFR BLD: 0 % (ref 0–8)
ERYTHROCYTE [DISTWIDTH] IN BLOOD BY AUTOMATED COUNT: 14 % (ref 11.5–14.5)
EST. GFR  (AFRICAN AMERICAN): 58.4 ML/MIN/1.73 M^2
EST. GFR  (NON AFRICAN AMERICAN): 50.6 ML/MIN/1.73 M^2
GLUCOSE SERPL-MCNC: 122 MG/DL (ref 70–110)
HCT VFR BLD AUTO: 44.4 % (ref 40–54)
HGB BLD-MCNC: 14.8 G/DL (ref 14–18)
IMM GRANULOCYTES # BLD AUTO: 0.04 K/UL (ref 0–0.04)
IMM GRANULOCYTES NFR BLD AUTO: 0.3 % (ref 0–0.5)
LYMPHOCYTES # BLD AUTO: 0.5 K/UL (ref 1–4.8)
LYMPHOCYTES NFR BLD: 4.6 % (ref 18–48)
MAGNESIUM SERPL-MCNC: 2.2 MG/DL (ref 1.6–2.6)
MCH RBC QN AUTO: 28.8 PG (ref 27–31)
MCHC RBC AUTO-ENTMCNC: 33.3 G/DL (ref 32–36)
MCV RBC AUTO: 87 FL (ref 82–98)
MONOCYTES # BLD AUTO: 0.8 K/UL (ref 0.3–1)
MONOCYTES NFR BLD: 7.1 % (ref 4–15)
NEUTROPHILS # BLD AUTO: 10.4 K/UL (ref 1.8–7.7)
NEUTROPHILS NFR BLD: 87.8 % (ref 38–73)
NRBC BLD-RTO: 0 /100 WBC
PHOSPHATE SERPL-MCNC: 2.8 MG/DL (ref 2.7–4.5)
PLATELET # BLD AUTO: 486 K/UL (ref 150–450)
PMV BLD AUTO: 8.9 FL (ref 9.2–12.9)
POTASSIUM SERPL-SCNC: 4.6 MMOL/L (ref 3.5–5.1)
RBC # BLD AUTO: 5.13 M/UL (ref 4.6–6.2)
SODIUM SERPL-SCNC: 137 MMOL/L (ref 136–145)
WBC # BLD AUTO: 11.79 K/UL (ref 3.9–12.7)

## 2022-06-28 PROCEDURE — 85025 COMPLETE CBC W/AUTO DIFF WBC: CPT | Performed by: STUDENT IN AN ORGANIZED HEALTH CARE EDUCATION/TRAINING PROGRAM

## 2022-06-28 PROCEDURE — 97116 GAIT TRAINING THERAPY: CPT

## 2022-06-28 PROCEDURE — 25000003 PHARM REV CODE 250: Performed by: STUDENT IN AN ORGANIZED HEALTH CARE EDUCATION/TRAINING PROGRAM

## 2022-06-28 PROCEDURE — 20600001 HC STEP DOWN PRIVATE ROOM

## 2022-06-28 PROCEDURE — 83735 ASSAY OF MAGNESIUM: CPT | Performed by: STUDENT IN AN ORGANIZED HEALTH CARE EDUCATION/TRAINING PROGRAM

## 2022-06-28 PROCEDURE — 97165 OT EVAL LOW COMPLEX 30 MIN: CPT

## 2022-06-28 PROCEDURE — 97161 PT EVAL LOW COMPLEX 20 MIN: CPT

## 2022-06-28 PROCEDURE — 25000003 PHARM REV CODE 250: Performed by: NURSE PRACTITIONER

## 2022-06-28 PROCEDURE — 97535 SELF CARE MNGMENT TRAINING: CPT

## 2022-06-28 PROCEDURE — 84100 ASSAY OF PHOSPHORUS: CPT | Performed by: STUDENT IN AN ORGANIZED HEALTH CARE EDUCATION/TRAINING PROGRAM

## 2022-06-28 PROCEDURE — 63600175 PHARM REV CODE 636 W HCPCS: Performed by: STUDENT IN AN ORGANIZED HEALTH CARE EDUCATION/TRAINING PROGRAM

## 2022-06-28 PROCEDURE — 94761 N-INVAS EAR/PLS OXIMETRY MLT: CPT

## 2022-06-28 PROCEDURE — 80048 BASIC METABOLIC PNL TOTAL CA: CPT | Performed by: STUDENT IN AN ORGANIZED HEALTH CARE EDUCATION/TRAINING PROGRAM

## 2022-06-28 PROCEDURE — 36415 COLL VENOUS BLD VENIPUNCTURE: CPT | Performed by: STUDENT IN AN ORGANIZED HEALTH CARE EDUCATION/TRAINING PROGRAM

## 2022-06-28 RX ORDER — OXYCODONE HYDROCHLORIDE 5 MG/1
5 TABLET ORAL EVERY 4 HOURS PRN
Status: DISCONTINUED | OUTPATIENT
Start: 2022-06-28 | End: 2022-06-29 | Stop reason: HOSPADM

## 2022-06-28 RX ADMIN — ENOXAPARIN SODIUM 40 MG: 100 INJECTION SUBCUTANEOUS at 04:06

## 2022-06-28 RX ADMIN — SODIUM CHLORIDE 1000 ML: 0.9 INJECTION, SOLUTION INTRAVENOUS at 08:06

## 2022-06-28 RX ADMIN — OXYCODONE 5 MG: 5 TABLET ORAL at 01:06

## 2022-06-28 RX ADMIN — ACETAMINOPHEN 1000 MG: 10 INJECTION INTRAVENOUS at 02:06

## 2022-06-28 RX ADMIN — OXYCODONE 5 MG: 5 TABLET ORAL at 08:06

## 2022-06-28 RX ADMIN — ACETAMINOPHEN 1000 MG: 10 INJECTION INTRAVENOUS at 05:06

## 2022-06-28 RX ADMIN — IBUPROFEN 800 MG: 800 INJECTION INTRAVENOUS at 01:06

## 2022-06-28 RX ADMIN — GABAPENTIN 300 MG: 300 CAPSULE ORAL at 08:06

## 2022-06-28 RX ADMIN — OXYCODONE HYDROCHLORIDE 10 MG: 10 TABLET ORAL at 09:06

## 2022-06-28 RX ADMIN — MUPIROCIN: 20 OINTMENT TOPICAL at 09:06

## 2022-06-28 RX ADMIN — MUPIROCIN: 20 OINTMENT TOPICAL at 10:06

## 2022-06-28 RX ADMIN — IBUPROFEN 800 MG: 400 TABLET, FILM COATED ORAL at 09:06

## 2022-06-28 RX ADMIN — IBUPROFEN 800 MG: 800 INJECTION INTRAVENOUS at 05:06

## 2022-06-28 RX ADMIN — GABAPENTIN 300 MG: 300 CAPSULE ORAL at 03:06

## 2022-06-28 RX ADMIN — GABAPENTIN 300 MG: 300 CAPSULE ORAL at 09:06

## 2022-06-28 NOTE — PLAN OF CARE
Problem: Occupational Therapy  Goal: Occupational Therapy Goal  Description: Goals to be met by: 7/28/2022 (1 month)     Patient will increase functional independence with ADLs by performing:    Grooming while standing at sink with Supervision.  Toileting from toilet with Supervision for hygiene and clothing management.   Rolling to Bilateral with [place].   Supine to sit with [place].  Step transfer with Supervision  Toilet transfer to toilet with Supervision.    Evaluated pt and established OT POC. Continue OT as tolerated.  Denise Parks OT  6/28/2022    Outcome: Ongoing, Progressing

## 2022-06-28 NOTE — CONSULTS
[facility]  Wound Care    Patient Name:  Art Carrillo   MRN:  [id number]  Date: 6/28/2022  Diagnosis: Rectal cancer    History:     Past Medical History:   Diagnosis Date    Rectal cancer     T3N2M0       Social History     Socioeconomic History    Marital status:    Tobacco Use    Smoking status: Never Smoker    Smokeless tobacco: Never Used       Precautions:     Allergies as of 06/06/2022    (No Known Allergies)       [facility] Assessment Details/Treatment   Patient seen for new ostomy consult. Initial ostomy education begun.  Goals of education include:     Pouch emptying, sizing/cuting pouch, and applying pouch   Stoma and susie-stomal care   Food choices and hydration   Obtaining supplies    Discussed and demonstrated cutting ostomy pouch and emptying pouch of stool and gas.  Patient returned demonstration without difficulty.  Discussed meal planning with particular foods to avoid.  Discussed importance of hydration and increasing fluid intake.  Explained process of ordering supplies to be delivered to patient's home. Provided reading materials regarding today's training and will follow up with patient tomorrow.  Offered patient to have any friend or family to attend next training session.    06/28/2022

## 2022-06-28 NOTE — PT/OT/SLP EVAL
Occupational Therapy   Co-Evaluation and Co-Treat  Co-treatment with PT for maximal pt participation, safety, and activity tolerance       Name: Art Carrillo  MRN: [id number]  Admitting Diagnosis:  Rectal cancer  Recent Surgery: Procedure(s) (LRB):  CLOSURE, COLOSTOMY (N/A)  CREATION, ILEOSTOMY (N/A)  SIGMOIDOSCOPY, FLEXIBLE (N/A)  INSERTION, CATHETER, URETER (Bilateral)  CATHETERIZATION, URETHRA  RESECTION, OMENTUM, LAPAROSCOPIC (N/A)  MOBILIZATION, SPLENIC FLEXURE (N/A) 1 Day Post-Op    Recommendations:     Discharge Recommendations: home  Discharge Equipment Recommendations:  none  Barriers to discharge:  None    Assessment:     Art Carrillo is a 47 y.o. male with a medical diagnosis of Rectal cancer.  He presents with impaired ADL and mobiliity performance deficits. Pt found upright in bed and agreeable for therapy today. Pt limited today 2/2 nature of generalized unsteadiness and decreased endurance 2/2 hospitalization. Pt required SBA for bed mobility and CGA for mobility in hallway tolerating ~3 minutes. Pt displayed wide HEATHER and increased time needed for task completion. Pt voiced abdomen and back discomfort however appeared to resolve with increased mobility. Prior to this admission, pt reports living with spouse and 3 children (14,13, and 9 yr old) who can A at discharge.  Pt was left upright in chair voicing appreciation. Pt would benefit from continued OT skilled services 2x/wk to improve daily living skills to optimize QOL.  Pt is recommended to discharge to home at this time. Performance deficits affecting function: weakness, impaired endurance, gait instability, impaired balance, impaired self care skills, impaired functional mobilty, pain.      Rehab Prognosis: Good; patient would benefit from acute skilled OT services to address these deficits and reach maximum level of function.       Plan:     Patient to be seen 2 x/week to address the above listed problems via self-care/home management,  "therapeutic activities, therapeutic exercises  · Plan of Care Expires: 07/28/22  · Plan of Care Reviewed with: patient    Subjective     Chief Complaint: back pain and abdomen discomfort near surgery site   Patient/Family Comments/goals: "I feel like I have sea legs!"    Occupational Profile:  Living Environment: Pt lives with his spouse and 3 children (14,13,9) in a H with 3 IZZY and R HR. Pt has a shower/tub combo for bathing.  Previous level of function: I with ADLs and mobility  Roles and Routines: Disabled; father and spouse. Enjoyed racing and was a   Equipment Used at Home:  none  Assistance upon Discharge: Spouse can A     Pain/Comfort:  · Pain Rating 1: 8/10  · Location - Orientation 1: generalized  · Location 1: abdomen  · Pain Addressed 1: Reposition, Distraction    Patients cultural, spiritual, Tenriism conflicts given the current situation: no    Objective:     Communicated with: RN prior to session.  Patient found HOB elevated with espinosa catheter, peripheral IV upon OT entry to room.    General Precautions: Standard, fall   Orthopedic Precautions:N/A   Braces: N/A  Respiratory Status: Room air    Occupational Performance:    Bed Mobility:    · Patient completed Rolling/Turning to Left with  stand by assistance  · Patient completed Scooting/Bridging with stand by assistance  · Patient completed Supine to Sit with stand by assistance    Functional Mobility/Transfers:  · Patient completed Sit <> Stand Transfer with contact guard assistance  with  no assistive device   · Patient completed Bed <> Chair Transfer using Step Transfer technique with contact guard assistance with no assistive device  · Functional Mobility: Pt stood and mobilized in room and into hallway ~3 minutes total with wide HEATHER and increased time required. Pt with lateral sway noted.    Activities of Daily Living:  · Grooming: politely declined  stating he would complete with spouse  · Upper Body Dressing: minimum assistance " willy josewn at EOB     Cognitive/Visual Perceptual:  Cognitive/Psychosocial Skills:     -       Oriented to: Person, Place, Time and Situation   -       Follows Commands/attention:Follows multistep  commands  -       Communication: clear/fluent  -       Memory: No Deficits noted  -       Safety awareness/insight to disability: intact   -       Mood/Affect/Coping skills/emotional control: Pleasant    Physical Exam:  Balance:    -       demo good sitting and fair static standing balance, lateral sway with dynamic gait   Dominant hand:    -       right  Upper Extremity Range of Motion:     -       Right Upper Extremity: WFL  -       Left Upper Extremity: WFL  Upper Extremity Strength:    -       Right Upper Extremity: WFL  -       Left Upper Extremity: WFL   Strength:    -       Right Upper Extremity: WNL  -       Left Upper Extremity: WNL    AMPAC 6 Click ADL:  AMPAC Total Score: 19    Treatment & Education:  Pt educated on role of occupational therapy, POC, and safety during ADLs and functional mobility. Pt and OT discussed importance of safe, continued mobility to optimize daily living skills. Pt verbalized understanding.     White board updated during session. Pt given instruction to call for medical staff/nurse for assistance.     Education:    Patient left up in chair with all lines intact, call button in reach and Rn notified    GOALS:   Multidisciplinary Problems     Occupational Therapy Goals        Problem: Occupational Therapy    Goal Priority Disciplines Outcome Interventions   Occupational Therapy Goal     OT, PT/OT Ongoing, Progressing    Description: Goals to be met by: 7/28/2022 (1 month)     Patient will increase functional independence with ADLs by performing:    Grooming while standing at sink with Supervision.  Toileting from toilet with Supervision for hygiene and clothing management.   Rolling to Bilateral with [place].   Supine to sit with [place].  Step transfer with  Supervision  Toilet transfer to toilet with Supervision.                     History:     Past Medical History:   Diagnosis Date    Rectal cancer     T3N2M0       Past Surgical History:   Procedure Laterality Date    FLEXIBLE SIGMOIDOSCOPY N/A 6/27/2022    Procedure: SIGMOIDOSCOPY, FLEXIBLE;  Surgeon: BROOKLYN Lock MD;  [facility]: [facility] OR [facility];  Service: Colon and Rectal;  Laterality: N/A;    ILEOSTOMY N/A 6/27/2022    Procedure: CREATION, ILEOSTOMY;  Surgeon: BROOKLYN Lock MD;  [facility];  Service: Colon and Rectal;  Laterality: N/A;    INSERTION OF URETERAL CATHETER Bilateral 6/27/2022    Procedure: INSERTION, CATHETER, URETER;  Surgeon: Edgardo Rdz MD;  [facility];  Service: Urology;  Laterality: Bilateral;    LAPAROSCOPIC GASTRIC BANDING  10/2009    LAPAROSCOPIC SURGICAL REMOVAL OF OMENTUM N/A 6/27/2022    Procedure: RESECTION, OMENTUM, LAPAROSCOPIC;  Surgeon: BROOKLYN Lock MD;  [facility];  Service: Colon and Rectal;  Laterality: N/A;    MOBILIZATION OF SPLENIC FLEXURE N/A 6/27/2022    Procedure: MOBILIZATION, SPLENIC FLEXURE;  Surgeon: BROOKLYN Lock MD;  [facility];  Service: Colon and Rectal;  Laterality: N/A;    PARASTOMAL HERNIA REPAIR  03/2021    ROBOT-ASSISTED LOW ANTERIOR RESECTION OF COLON      URETHRAL CATHETERIZATION  6/27/2022    Procedure: CATHETERIZATION, URETHRA;  Surgeon: Edgardo Rdz MD;  Location: [facility] OR [address] [place] [address];  Service: Urology;;       Time Tracking:     OT Date of Treatment: 06/28/22  OT Start Time: 0842  OT Stop Time: 0903  OT Total Time (min): 21 min    Billable Minutes:Evaluation 11 min  Self Care/Home Management 10 min    6/28/2022

## 2022-06-28 NOTE — PLAN OF CARE
Pt is A&O x4. VS remained stable throughout the shift. Pt is on a clear liquid diet and tolerating well. Pain controlled with scheduled and PRN meds. 3x lap sites CDI. Adequate UOP via espinosa. Pt resting comfortably, bed in low position, call light within reach, WCTM.    Problem: Adult Inpatient Plan of Care  Goal: Plan of Care Review  Outcome: Ongoing, Progressing     Problem: Adult Inpatient Plan of Care  Goal: Patient-Specific Goal (Individualized)  Outcome: Ongoing, Progressing     Problem: Adult Inpatient Plan of Care  Goal: Absence of Hospital-Acquired Illness or Injury  Outcome: Ongoing, Progressing     Problem: Adult Inpatient Plan of Care  Goal: Optimal Comfort and Wellbeing  Outcome: Ongoing, Progressing     Problem: Adult Inpatient Plan of Care  Goal: Readiness for Transition of Care  Outcome: Ongoing, Progressing

## 2022-06-28 NOTE — PLAN OF CARE
[facility]  Initial Discharge Assessment       Primary Care Provider: TIFFANI Sampson    Admission Diagnosis: History of rectal cancer [Z85.048]  Colostomy in place [Z93.3]  Rectal cancer [C20]    Admission Date: 6/27/2022  Expected Discharge Date: 7/1/2022    Discharge Barriers Identified: None    Payor: [employer] MANAGED MEDICARE / Plan: [employer] MEDICARE PPO / Product Type: Medicare Advantage /     Extended Emergency Contact Information  Primary Emergency Contact: Rosanna Carrillo  Mobile Phone: [phone]  Relation: Spouse    Discharge Plan A: Home         [id number] - [place], [place] - [address]  [address]  [place] [address]  Phone: [phone] Fax: [phone]      Initial Assessment (most recent)     Adult Discharge Assessment - 06/28/22 1025        Discharge Assessment    Assessment Type Discharge Planning Assessment     Confirmed/corrected address, phone number and insurance Yes     Confirmed Demographics Correct on Facesheet     Source of Information patient     Communicated CURT with patient/caregiver Yes     Lives With child(melanie), dependent;spouse     Do you expect to return to your current living situation? Yes     Do you have help at home or someone to help you manage your care at home? No     Prior to hospitilization cognitive status: Alert/Oriented     Current cognitive status: Alert/Oriented     Walking or Climbing Stairs Difficulty none     Dressing/Bathing Difficulty none     Equipment Currently Used at Home none     Do you currently have service(s) that help you manage your care at home? No     Do you take prescription medications? Yes     Do you have prescription coverage? Yes     Do you have any problems affording any of your prescribed medications? TBD     Is the patient taking medications as prescribed? yes     Who is going to help you get home at discharge? wife     How do you get to doctors appointments? car, drives self;family or friend will provide     Are you  on dialysis? No     Do you take coumadin? No     Discharge Plan A Home     DME Needed Upon Discharge  other (see comments)   tbd    Discharge Plan discussed with: Patient     Discharge Barriers Identified None        Relationship/Environment    Name(s) of Who Lives With Patient wife-Rosanna Carrillo [phone]               Mabel Mcintosh LCSW  PRN

## 2022-06-28 NOTE — PLAN OF CARE
POC reviewed with PT, stated understanding, AXO4, VSS.  Lap sites and IX, WDL. Skin otherwise intact.  Pain managed with PO meds per MAR, denies N/V this shift.  Tolerating clears at this time.  F/C draining CYU, last BM in flowsheets.   Educated on IS and OOB.  NO adverse events this shift, bed low, call light in reach.  Will cont to manage POC.            Problem: Adult Inpatient Plan of Care  Goal: Plan of Care Review  Outcome: Ongoing, Progressing  Goal: Patient-Specific Goal (Individualized)  Outcome: Ongoing, Progressing  Goal: Absence of Hospital-Acquired Illness or Injury  Outcome: Ongoing, Progressing  Goal: Optimal Comfort and Wellbeing  Outcome: Ongoing, Progressing  Goal: Readiness for Transition of Care  Outcome: Ongoing, Progressing     Problem: Infection  Goal: Absence of Infection Signs and Symptoms  Outcome: Ongoing, Progressing     Problem: Fall Injury Risk  Goal: Absence of Fall and Fall-Related Injury  Outcome: Ongoing, Progressing

## 2022-06-28 NOTE — NURSING TRANSFER
Nursing Transfer Note      6/27/2022     Reason patient is being transferred: post op care    Transfer From: [facility]    Transfer via bed    Transfer with IVF, patient belongings    Transported by transport personnel    Medicines sent: none    Any special needs or follow-up needed: none    Chart send with patient: Yes    Notified: spouse, family at bedside    Patient reassessed at:  (06/27/22 1810)    Upon arrival to floor: patient oriented to room, call bell in reach and bed in lowest position

## 2022-06-28 NOTE — ASSESSMENT & PLAN NOTE
OR 6/27 colostomy closure     -await return of bowel function, lfd  -continue multi modality for pain control  -encourage ambulation and use of IS  -jamie hose and SCD  -prophalactic lovenox and PPI  -dc espinosa, await void

## 2022-06-28 NOTE — PT/OT/SLP EVAL
Physical Therapy Co-Evaluation and Co-Treatment with OT    Patient Name:  Art Carrillo   MRN:  [id number]    Recent Surgery: Procedure(s) (LRB):  CLOSURE, COLOSTOMY (N/A)  CREATION, ILEOSTOMY (N/A)  SIGMOIDOSCOPY, FLEXIBLE (N/A)  INSERTION, CATHETER, URETER (Bilateral)  CATHETERIZATION, URETHRA  RESECTION, OMENTUM, LAPAROSCOPIC (N/A)  MOBILIZATION, SPLENIC FLEXURE (N/A) 1 Day Post-Op    *Co-treatment with OT due to suspected patient complexity and need for two skilled therapists to ensure safe mobilization.    Recommendations:     Discharge Recommendations:  home   Discharge Equipment Recommendations: none   Barriers to discharge: None    Highest Level of Mobility: Gait 160'  Assistance Required: CGA no AD    Assessment:     Art Carrillo is a 47 y.o. male admitted with a medical diagnosis of Rectal cancer. He presents with the following impairments/functional limitations:  weakness, impaired endurance, gait instability, impaired balance, pain, impaired functional mobilty    Pt met with HOB elevated and agreeable to PT session. Pt reports his PLOF is (I) with functional mobility and ADLs using no DME. He is currently limited by above listed deficits and now requires CGA for gait training due to lateral instability. Pt able to complete gait training and stair navigation on eval. He is functioning close to baseline, but will benefit from continued PT to address gait instability.     Pt would benefit from continued skilled acute PT 2x/wk to address above listed functional deficits, provide patient/caregiver education, reduce fall risk, and maximize (I) and safety with functional mobility. Writing therapist anticipates pt will have no post acute PT needs.    Rehab Prognosis: Good; patient would benefit from acute skilled PT services to address these deficits and reach maximum level of function.      Plan:     During this hospitalization, patient to be seen 2 x/week to address the identified rehab impairments via gait  "training, therapeutic activities, therapeutic exercises and progress toward the following goals:    · Plan of Care Expires:  07/28/22    This plan of care has been discussed with the patient/caregiver, who was included in its development and is in agreement with the identified goals and treatment plan.     Subjective     Communicated with RN prior to session.  Patient agreeable to participate.     Chief Complaint: Lower abdominal pain  Patient/Family Comments/goals: "I really enjoy racing"    Pain/Comfort:  · Pain Rating 1: 8/10  · Location - Orientation 1: generalized  · Location 1: abdomen  · Pain Addressed 1: Reposition, Distraction  · Pain Rating Post-Intervention 1: 8/10    Patients cultural, spiritual, Confucianist conflicts given the current situation: no    Patient's living environment is as follows:  Living Environment: Pt lives with spouse and 3 children in [facility] with 3 IZZY, R handrails. Bathroom set-up: tub/shower combo  Prior Level of Function: independent with mobility and ADLs  DME used: none  DME owned (not currently used): none  Upon discharge, patient will have assistance from: Family    Objective:     Patient found HOB elevated with espinosa catheter, peripheral IV  upon PT entry to room.    General Precautions: Standard, fall   Orthopedic Precautions:N/A   Braces: N/A   /75 (Patient Position: Sitting)   Pulse 60   Temp 97.7 °F (36.5 °C) (Oral)   Resp 18   Ht 6' 3" (1.905 m)   Wt 131 kg (288 lb 12.8 oz)   SpO2 95%   BMI 36.10 kg/m²   Oxygen Device: room air      Exams:     Cognition:  o Patient is oriented to Person, Place, Time, Situation, follows commands 100% of the time     Edema: None present     Postural examination/scapula alignment: Rounded shoulder and Head forward     Lower Extremity Range of Motion:  o Right Lower Extremity: WNL  o Left Lower Extremity: WNL     Lower Extremity Strength:      Iliopsoas Quadriceps Knee  Flexion (HS) Tibialis  anterior Gastro- cnemius EHL   R LE " NT (abdominal pain) 4+/5 4+/5 4+/5 4+/5 NT   L LE NT (abdominal pain) 4+/5 4+/5 4+/5 4+/5 NT       Sensation:   o Light touch sensation: Absent RLE  and Absent LLE    Functional Mobility:    Bed Mobility:  · Supine to Sit: Stand-by Assistance on L side of bed  · Scooting anteriorly to EOB to plant feet on floor: Stand-by Assistance    Transfers:   · Sit to Stand Transfer: Stand-by Assistance  from EOB with no AD   · Bed to Chair: Stand-by Assistance with no AD              Gait:  · Patient received gait training in hallway 160 feet with Contact Guard Assistance and no AD  · Gait Assessment: occasional unsteady gait, decreased step length, narrow base of support, flexed posture, decreased kari and decreased arm swing  · Gait Pattern Observed: Step-through reciprocal gait  · Comments: All lines remained intact throughout ambulation trial, mask donned for out of room ambulation. Pt with mild lateral postural sway and occasional LOB that he is able to self-correct using step strategy. PT provided verbal cues for upright posture and forward gaze    Stair Navigation:    Pt ascended/descended 3 stair(s) with right handrail and Contact Guard Assistance.     Balance:  · Static Sit:   · Supervision at EOB  · Normal: Patient able to maintain steady balance without handhold support.  · Static Stand:   · Stand-By Assist with no AD  · Normal: Patient able to maintain steady balance without handhold support.  · Dynamic Stand:  · Contact-Guard Assist with no AD        Therapeutic Activities/Exercises      Patient assisted with functional mobility as noted above   PT instructed pt to ambulate at least 3x daily with staff assistance   Patient educated on the importance of early mobility to prevent functional decline during hospital stay   Patient was instructed to utilize staff assistance for mobility/transfers.  o Patient is appropriate to transfer with CGA and RN/PCT assist   Patient educated on PT POC and role of PT in  acute care   White board updated to include patient's safest level of mobility with staff assistance, RN also updated    AM-PAC 6 CLICK MOBILITY  Turning over in bed (including adjusting bedclothes, sheets and blankets)?: 4  Sitting down on and standing up from a chair with arms (e.g., wheelchair, bedside commode, etc.): 4  Moving from lying on back to sitting on the side of the bed?: 4  Moving to and from a bed to a chair (including a wheelchair)?: 3  Need to walk in hospital room?: 3  Climbing 3-5 steps with a railing?: 3  Basic Mobility Total Score: 21      Patient left up in chair with all lines intact, call button in reach and RN notified.      History/Goals:     PAST MEDICAL HISTORY:  Past Medical History:   Diagnosis Date    Rectal cancer     T3N2M0       Past Surgical History:   Procedure Laterality Date    FLEXIBLE SIGMOIDOSCOPY N/A 6/27/2022    Procedure: SIGMOIDOSCOPY, FLEXIBLE;  Surgeon: BROOKLYN Lock MD;  Location: [facility] OR [address] [place] [address];  Service: Colon and Rectal;  Laterality: N/A;    ILEOSTOMY N/A 6/27/2022    Procedure: CREATION, ILEOSTOMY;  Surgeon: BROOKLYN Lock MD;  Location: [facility] OR [address] [place] [address];  Service: Colon and Rectal;  Laterality: N/A;    INSERTION OF URETERAL CATHETER Bilateral 6/27/2022    Procedure: INSERTION, CATHETER, URETER;  Surgeon: Edgardo Rdz MD;  Location: [facility] OR [address] [place] [address];  Service: Urology;  Laterality: Bilateral;    LAPAROSCOPIC GASTRIC BANDING  10/2009    LAPAROSCOPIC SURGICAL REMOVAL OF OMENTUM N/A 6/27/2022    Procedure: RESECTION, OMENTUM, LAPAROSCOPIC;  Surgeon: BROOKLYN Lock MD;  Location: [facility] OR [address] [place] [address];  Service: Colon and Rectal;  Laterality: N/A;    MOBILIZATION OF SPLENIC FLEXURE N/A 6/27/2022    Procedure: MOBILIZATION, SPLENIC FLEXURE;  Surgeon: BROOKLYN Lock MD;  Location: [facility] OR [address] [place] [address];  Service: Colon and Rectal;  Laterality: N/A;    PARASTOMAL HERNIA REPAIR  03/2021    ROBOT-ASSISTED LOW ANTERIOR RESECTION OF COLON       URETHRAL CATHETERIZATION  2022    Procedure: CATHETERIZATION, URETHRA;  Surgeon: Edgardo Rdz MD;  Location: [facility] OR [address] [place] [address];  Service: Urology;;       GOALS:   Multidisciplinary Problems     Physical Therapy Goals        Problem: Physical Therapy    Goal Priority Disciplines Outcome Goal Variances Interventions   Physical Therapy Goal     PT, PT/OT Ongoing, Progressing     Description: Goals to be met by: 22    Patient will increase functional independence with mobility by performin. Supine to sit with [place]  2. Sit to supine with [place]  3. Sit to stand transfer with Supervision  4. Bed to chair transfer with Supervision using LRAD  5. Gait  x 100 feet with Supervision using LRAD.   6. Ascend/descend 3 stair with right Handrails and Supervision  7. Lower extremity exercise program x15 reps per handout, with supervision                     Time Tracking:     PT Received On: 22  PT Start Time: 842     PT Stop Time: 0903  PT Total Time (min): 21 min     Billable Minutes: Evaluation 10 and Gait Training 11      Shobha Myers, PT  2022  Pager# [phone]

## 2022-06-28 NOTE — ANESTHESIA POSTPROCEDURE EVALUATION
Anesthesia Post Evaluation    Patient: Art Carrillo    Procedure(s) Performed: Procedure(s) (LRB):  CLOSURE, COLOSTOMY (N/A)  CREATION, ILEOSTOMY (N/A)  SIGMOIDOSCOPY, FLEXIBLE (N/A)  INSERTION, CATHETER, URETER (Bilateral)  CATHETERIZATION, URETHRA  RESECTION, OMENTUM, LAPAROSCOPIC (N/A)  MOBILIZATION, SPLENIC FLEXURE (N/A)    Final Anesthesia Type: general      Patient location during evaluation: PACU  Patient participation: Yes- Able to Participate  Level of consciousness: awake and alert  Post-procedure vital signs: reviewed and stable  Pain management: adequate  Airway patency: patent    PONV status at discharge: No PONV  Anesthetic complications: no      Cardiovascular status: blood pressure returned to baseline  Respiratory status: unassisted, spontaneous ventilation and room air  Hydration status: euvolemic  Follow-up not needed.          Vitals Value Taken Time   /75 06/28/22 1118   Temp 36.5 °C (97.7 °F) 06/28/22 1118   Pulse 60 06/28/22 1118   Resp 18 06/28/22 1118   SpO2 95 % 06/28/22 1118         Event Time   Out of Recovery 16:15:00         Pain/Jose Alfredo Score: Pain Rating Prior to Med Admin: 5 (6/28/2022  8:29 AM)  Pain Rating Post Med Admin: 2 (6/28/2022  5:30 AM)  Jose Alfredo Score: 9 (6/27/2022  4:15 PM)

## 2022-06-28 NOTE — SUBJECTIVE & OBJECTIVE
Subjective:     Interval History: no acute events overnite, adequate pain control, manjit cld, denies flatus or stool    Post-Op Info:  Procedure(s) (LRB):  CLOSURE, COLOSTOMY (N/A)  CREATION, ILEOSTOMY (N/A)  SIGMOIDOSCOPY, FLEXIBLE (N/A)  INSERTION, CATHETER, URETER (Bilateral)  CATHETERIZATION, URETHRA  RESECTION, OMENTUM, LAPAROSCOPIC (N/A)  MOBILIZATION, SPLENIC FLEXURE (N/A)   1 Day Post-Op      Medications:  Continuous Infusions:   sodium chloride 0.9% 40 mL/hr at 06/27/22 1554     Scheduled Meds:   acetaminophen  1,000 mg Intravenous Q8H    Followed by    acetaminophen  1,000 mg Oral Q8H    enoxaparin  40 mg Subcutaneous Daily    gabapentin  300 mg Oral TID    ibuprofen  800 mg Intravenous Q8H    Followed by    ibuprofen  800 mg Oral Q8H    mupirocin   Nasal BID     PRN Meds:   ondansetron    oxyCODONE    oxyCODONE    prochlorperazine    sodium chloride 0.9%    traMADoL        Objective:     Vital Signs (Most Recent):  Temp: 97.7 °F (36.5 °C) (06/28/22 1118)  Pulse: 60 (06/28/22 1118)  Resp: 18 (06/28/22 1118)  BP: 127/75 (06/28/22 1118)  SpO2: 95 % (06/28/22 1118) Vital Signs (24h Range):  Temp:  [96.1 °F (35.6 °C)-98.6 °F (37 °C)] 97.7 °F (36.5 °C)  Pulse:  [52-74] 60  Resp:  [14-20] 18  SpO2:  [91 %-100 %] 95 %  BP: ()/(55-75) 127/75     Intake/Output - Last 3 Shifts         06/26 0700 06/27 0659 06/27 0700 06/28 0659 06/28 0700 06/29 0659    P.O.  50 360    I.V. (mL/kg)  491.8 (3.8)     IV Piggyback  1798.6     Total Intake(mL/kg)  2340.4 (17.9) 360 (2.7)    Urine (mL/kg/hr)  850     Stool  0 300    Total Output  850 300    Net  +1490.4 +60           Stool Occurrence  1 x 0 x            Physical Exam  Vitals and nursing note reviewed.   Constitutional:       Appearance: He is well-developed.   Cardiovascular:      Rate and Rhythm: Normal rate and regular rhythm.      Heart sounds: Normal heart sounds.   Pulmonary:      Effort: Pulmonary effort is normal. No respiratory distress.      Breath  sounds: Normal breath sounds. No wheezing or rales.   Abdominal:      General: There is no distension.      Palpations: Abdomen is soft. There is no mass.      Tenderness: There is no abdominal tenderness. There is no guarding.      Comments: Former ostomy site healing well   Musculoskeletal:         General: Normal range of motion.   Skin:     General: Skin is warm and dry.   Neurological:      Mental Status: He is alert and oriented to person, place, and time.   Psychiatric:         Behavior: Behavior normal.         Thought Content: Thought content normal.         Judgment: Judgment normal.           Significant Labs:  BMP (Last 3 Results):   Recent Labs   Lab 06/27/22  1546 06/28/22  0239   * 122*    137   K 4.4 4.6    109   CO2 20* 17*   BUN 12 17   CREATININE 1.4 1.6*   CALCIUM 7.9* 8.2*   MG 2.0 2.2     CBC (Last 3 Results):   Recent Labs   Lab 06/27/22  1546 06/28/22  0239   WBC 13.84* 11.79   RBC 5.61 5.13   HGB 15.9 14.8   HCT 48.8 44.4   * 486*   MCV 87 87   MCH 28.3 28.8   MCHC 32.6 33.3       Significant Diagnostics:  None

## 2022-06-28 NOTE — PROGRESS NOTES
[facility]  Colorectal Surgery  Progress Note    Patient Name: Art Carrillo  MRN: [id number]  Admission Date: 6/27/2022  Hospital Length of Stay: 1 days  Attending Physician: BROOKLYN Lock MD    Subjective:     Interval History: no acute events overnite, adequate pain control, manjit cld, denies flatus or stool    Post-Op Info:  Procedure(s) (LRB):  CLOSURE, COLOSTOMY (N/A)  CREATION, ILEOSTOMY (N/A)  SIGMOIDOSCOPY, FLEXIBLE (N/A)  INSERTION, CATHETER, URETER (Bilateral)  CATHETERIZATION, URETHRA  RESECTION, OMENTUM, LAPAROSCOPIC (N/A)  MOBILIZATION, SPLENIC FLEXURE (N/A)   1 Day Post-Op      Medications:  Continuous Infusions:   sodium chloride 0.9% 40 mL/hr at 06/27/22 1554     Scheduled Meds:   acetaminophen  1,000 mg Intravenous Q8H    Followed by    acetaminophen  1,000 mg Oral Q8H    enoxaparin  40 mg Subcutaneous Daily    gabapentin  300 mg Oral TID    ibuprofen  800 mg Intravenous Q8H    Followed by    ibuprofen  800 mg Oral Q8H    mupirocin   Nasal BID     PRN Meds:   ondansetron    oxyCODONE    oxyCODONE    prochlorperazine    sodium chloride 0.9%    traMADoL        Objective:     Vital Signs (Most Recent):  Temp: 97.7 °F (36.5 °C) (06/28/22 1118)  Pulse: 60 (06/28/22 1118)  Resp: 18 (06/28/22 1118)  BP: 127/75 (06/28/22 1118)  SpO2: 95 % (06/28/22 1118) Vital Signs (24h Range):  Temp:  [96.1 °F (35.6 °C)-98.6 °F (37 °C)] 97.7 °F (36.5 °C)  Pulse:  [52-74] 60  Resp:  [14-20] 18  SpO2:  [91 %-100 %] 95 %  BP: ()/(55-75) 127/75     Intake/Output - Last 3 Shifts         06/26 0700 06/27 0659 06/27 0700 06/28 0659 06/28 0700 06/29 0659    P.O.  50 360    I.V. (mL/kg)  491.8 (3.8)     IV Piggyback  1798.6     Total Intake(mL/kg)  2340.4 (17.9) 360 (2.7)    Urine (mL/kg/hr)  850     Stool  0 300    Total Output  850 300    Net  +1490.4 +60           Stool Occurrence  1 x 0 x            Physical Exam  Vitals and nursing note reviewed.   Constitutional:       Appearance: He is  well-developed.   Cardiovascular:      Rate and Rhythm: Normal rate and regular rhythm.      Heart sounds: Normal heart sounds.   Pulmonary:      Effort: Pulmonary effort is normal. No respiratory distress.      Breath sounds: Normal breath sounds. No wheezing or rales.   Abdominal:      General: There is no distension.      Palpations: Abdomen is soft. There is no mass.      Tenderness: There is no abdominal tenderness. There is no guarding.      Comments: Former ostomy site healing well   Musculoskeletal:         General: Normal range of motion.   Skin:     General: Skin is warm and dry.   Neurological:      Mental Status: He is alert and oriented to person, place, and time.   Psychiatric:         Behavior: Behavior normal.         Thought Content: Thought content normal.         Judgment: Judgment normal.           Significant Labs:  BMP (Last 3 Results):   Recent Labs   Lab 06/27/22  1546 06/28/22  0239   * 122*    137   K 4.4 4.6    109   CO2 20* 17*   BUN 12 17   CREATININE 1.4 1.6*   CALCIUM 7.9* 8.2*   MG 2.0 2.2     CBC (Last 3 Results):   Recent Labs   Lab 06/27/22  1546 06/28/22  0239   WBC 13.84* 11.79   RBC 5.61 5.13   HGB 15.9 14.8   HCT 48.8 44.4   * 486*   MCV 87 87   MCH 28.3 28.8   MCHC 32.6 33.3       Significant Diagnostics:  None    Assessment/Plan:     * Rectal cancer  OR 6/27 colostomy closure     -await return of bowel function, lfd  -continue multi modality for pain control  -encourage ambulation and use of IS  -jamie hose and SCD  -prophalactic lovenox and PPI  -dc espinosa, await void          Rosa Maria Bay NP  Colorectal Surgery  [facility]

## 2022-06-29 VITALS
HEART RATE: 68 BPM | OXYGEN SATURATION: 94 % | SYSTOLIC BLOOD PRESSURE: 100 MMHG | DIASTOLIC BLOOD PRESSURE: 58 MMHG | RESPIRATION RATE: 17 BRPM | WEIGHT: 288.81 LBS | TEMPERATURE: 98 F | BODY MASS INDEX: 35.91 KG/M2 | HEIGHT: 75 IN

## 2022-06-29 LAB
ANION GAP SERPL CALC-SCNC: 5 MMOL/L (ref 8–16)
BUN SERPL-MCNC: 15 MG/DL (ref 6–20)
CALCIUM SERPL-MCNC: 8 MG/DL (ref 8.7–10.5)
CHLORIDE SERPL-SCNC: 107 MMOL/L (ref 95–110)
CO2 SERPL-SCNC: 24 MMOL/L (ref 23–29)
CREAT SERPL-MCNC: 1.1 MG/DL (ref 0.5–1.4)
EST. GFR  (AFRICAN AMERICAN): >60 ML/MIN/1.73 M^2
EST. GFR  (NON AFRICAN AMERICAN): >60 ML/MIN/1.73 M^2
GLUCOSE SERPL-MCNC: 81 MG/DL (ref 70–110)
POTASSIUM SERPL-SCNC: 4 MMOL/L (ref 3.5–5.1)
SODIUM SERPL-SCNC: 136 MMOL/L (ref 136–145)

## 2022-06-29 PROCEDURE — 36415 COLL VENOUS BLD VENIPUNCTURE: CPT | Performed by: STUDENT IN AN ORGANIZED HEALTH CARE EDUCATION/TRAINING PROGRAM

## 2022-06-29 PROCEDURE — 80048 BASIC METABOLIC PNL TOTAL CA: CPT | Performed by: STUDENT IN AN ORGANIZED HEALTH CARE EDUCATION/TRAINING PROGRAM

## 2022-06-29 PROCEDURE — 25000003 PHARM REV CODE 250: Performed by: STUDENT IN AN ORGANIZED HEALTH CARE EDUCATION/TRAINING PROGRAM

## 2022-06-29 RX ORDER — OXYCODONE HYDROCHLORIDE 10 MG/1
10 TABLET ORAL EVERY 4 HOURS PRN
Qty: 20 TABLET | Refills: 0 | Status: ON HOLD | OUTPATIENT
Start: 2022-06-29 | End: 2022-08-31 | Stop reason: HOSPADM

## 2022-06-29 RX ORDER — GABAPENTIN 300 MG/1
300 CAPSULE ORAL 3 TIMES DAILY
Qty: 90 CAPSULE | Refills: 0 | Status: SHIPPED | OUTPATIENT
Start: 2022-06-29 | End: 2022-06-29 | Stop reason: HOSPADM

## 2022-06-29 RX ADMIN — IBUPROFEN 800 MG: 400 TABLET, FILM COATED ORAL at 06:06

## 2022-06-29 RX ADMIN — ACETAMINOPHEN 1000 MG: 500 TABLET ORAL at 06:06

## 2022-06-29 RX ADMIN — GABAPENTIN 300 MG: 300 CAPSULE ORAL at 08:06

## 2022-06-29 RX ADMIN — TRAMADOL HYDROCHLORIDE 50 MG: 50 TABLET, COATED ORAL at 11:06

## 2022-06-29 RX ADMIN — MUPIROCIN: 20 OINTMENT TOPICAL at 08:06

## 2022-06-29 NOTE — PLAN OF CARE
Problem: Adult Inpatient Plan of Care  Goal: Plan of Care Review  Outcome: Ongoing, Progressing  Goal: Absence of Hospital-Acquired Illness or Injury  Outcome: Ongoing, Progressing  Goal: Optimal Comfort and Wellbeing  Outcome: Ongoing, Progressing  Goal: Readiness for Transition of Care  Outcome: Ongoing, Progressing     Problem: Infection  Goal: Absence of Infection Signs and Symptoms  Outcome: Ongoing, Progressing     Problem: Fall Injury Risk  Goal: Absence of Fall and Fall-Related Injury  Outcome: Ongoing, Progressing

## 2022-06-29 NOTE — PROGRESS NOTES
Met with patient for ostomy training lesson #2.  Removed patient's ostomy pouch and discussed stoma and susie-stomal care.  Demonstrated cutting pouch to fit stoma allowing 1/8 inch clearance.  Pt returned demonstration and applied pouch to stoma site without difficulty. Ostomy belt applied. Reviewed reading materials answering all questions.  Pt verbalized understanding of when to alert MD of issues with stoma.  Discussed follow up plan of care and provided patient with contact name and numbers.

## 2022-06-29 NOTE — HOSPITAL COURSE
47 y.o. year-old male with history of rectal cancer status post low anterior resection in 2018. At that time, colorectal anastomosis was unable to be performed secondary to inadequate length.  He then developed a peristomal hernia that was repaired.  At that time, the pelvis was unable to be read dissected.  He presented for evaluation for colostomy takedown.  He appeared to have adequate length as well as a small rectal stump.  Colostomy takedown was therefore offered along with fecal diversion secondary to past radiation.  He had a normal post op course, once ileostomy was functional diet was advanced.  On day of discharge pt is manjit regular diet, inc line healing well, ileosotmy functional, ambulating in [place] without difficulty, adequate pain control with oral medication, VS stable and afebrile.  FU 2 weeks Dr. Lock and Micaela

## 2022-06-29 NOTE — PROGRESS NOTES
Given education for discharge, questions addressed. PIV removed per order. PT to car via W/C with family.

## 2022-06-29 NOTE — PLAN OF CARE
[facility]  Discharge Final Note    Primary Care Provider: TIFFANI Sampson    Expected Discharge Date: 6/29/2022    Final Discharge Note (most recent)     Final Note - 06/29/22 1143        Final Note    Assessment Type Final Discharge Note     Anticipated Discharge Disposition Home or Self Care     Hospital Resources/Appts/Education Provided Appointments scheduled and added to AVS        Post-Acute Status    Post-Acute Authorization Other     Other Status No Post-Acute Service Needs     Discharge Delays None known at this time                 Important Message from Medicare  Important Message from Medicare regarding Discharge Appeal Rights: Given to patient/caregiver, Explained to patient/caregiver, Signed/date by patient/caregiver     Date IMM was signed: 06/29/22  Time IMM was signed: 1004    Contact Info     W Farhat Lock MD   Specialty: Colon and Rectal Surgery    [address] [facility] [address]   Phone: [phone]       Next Steps: Follow up in 2 week(s)    Micaela Chaidez, TERRY   Specialty: Wound Care    [address] [facility] [address]   Phone: [phone]       Next Steps: Follow up in 2 week(s)        Future Appointments   Date Time Provider Department Center   7/19/2022  2:30 PM Windy Singh NP [facility] COLON [facility]   7/19/2022  3:40 PM BROOKLYN Lock MD [facility] COLON [facility] LCSW  PRN

## 2022-06-29 NOTE — ASSESSMENT & PLAN NOTE
OR 6/27 colostomy closure     -await return of bowel function, lfd  -continue multi modality for pain control  -encourage ambulation and use of IS  -jamie hose and SCD  -prophalactic lovenox and PPI  -dc espinosa, voiding

## 2022-06-29 NOTE — DISCHARGE SUMMARY
[facility]  Colorectal Surgery  Discharge Summary      Patient Name: Art Carrillo  MRN: [id number]  Admission Date: 6/27/2022  Hospital Length of Stay: 2 days  Discharge Date and Time: 6/29/22  Attending Physician: BROOKLYN Lock MD   Discharging Provider: Rosa Maria Bay NP  Primary Care Provider: TIFFAIN Sampson     HPI:  No notes on file    Procedure(s) (LRB):  CLOSURE, COLOSTOMY (N/A)  CREATION, ILEOSTOMY (N/A)  SIGMOIDOSCOPY, FLEXIBLE (N/A)  INSERTION, CATHETER, URETER (Bilateral)  CATHETERIZATION, URETHRA  RESECTION, OMENTUM, LAPAROSCOPIC (N/A)  MOBILIZATION, SPLENIC FLEXURE (N/A)     Hospital Course:  47 y.o. year-old male with history of rectal cancer status post low anterior resection in 2018. At that time, colorectal anastomosis was unable to be performed secondary to inadequate length.  He then developed a peristomal hernia that was repaired.  At that time, the pelvis was unable to be read dissected.  He presented for evaluation for colostomy takedown.  He appeared to have adequate length as well as a small rectal stump.  Colostomy takedown was therefore offered along with fecal diversion secondary to past radiation.  He had a normal post op course, once ileostomy was functional diet was advanced.  On day of discharge pt is manjit regular diet, inc line healing well, ileosotmy functional, ambulating in [place] without difficulty, adequate pain control with oral medication, VS stable and afebrile.  FU 2 weeks Dr. Lock and Micaela         Goals of Care Treatment Preferences:             Significant Diagnostic Studies: Labs:   BMP:   Recent Labs   Lab 06/27/22  1546 06/28/22  0239 06/29/22  0226   * 122* 81    137 136   K 4.4 4.6 4.0    109 107   CO2 20* 17* 24   BUN 12 17 15   CREATININE 1.4 1.6* 1.1   CALCIUM 7.9* 8.2* 8.0*   MG 2.0 2.2  --     and CBC   Recent Labs   Lab 06/27/22  1546 06/28/22  0239   WBC 13.84* 11.79   HGB 15.9 14.8   HCT 48.8 44.4   * 486*        Pending Diagnostic Studies:     Procedure Component Value Units Date/Time    Specimen to Pathology, Surgery General Surgery (colorectal) [[id number]] Collected: 06/27/22 1510    Order Status: Sent Lab Status: In process Updated: 06/27/22 1719    Specimen: Tissue         Final Active Diagnoses:    Diagnosis Date Noted POA    PRINCIPAL PROBLEM:  Rectal cancer [C20]  Yes      Problems Resolved During this Admission:      Discharged Condition: good    Disposition: Home or Self Care    Follow Up:   Follow-up Information     MICHELET Lock MD Follow up in 2 week(s).    Specialty: Colon and Rectal Surgery  Contact information:  [address] [facility] [id number] [phone]             TERRY Starr Follow up in 2 week(s).    Specialty: Wound Care  Contact information:  [address] [facility] [id number] [phone]                       Patient Instructions:      Diet Adult Regular   Order Comments: Low fiber, no fresh fruit or fresh vegetables, no nuts, grapes, popcorn or raisins  Call office for output from ileosotmy greater then 1500cc/24 hours     Lifting restrictions   Order Comments: No lifting anything greater than 5 pounds     No dressing needed   Order Comments: May shower, no tub bath     Notify your health care provider if you experience any of the following:  temperature >100.4     Notify your health care provider if you experience any of the following:  persistent nausea and vomiting or diarrhea     Notify your health care provider if you experience any of the following:  severe uncontrolled pain     Notify your health care provider if you experience any of the following:  redness, tenderness, or signs of infection (pain, swelling, redness, odor or green/yellow discharge around incision site)     Notify your health care provider if you experience any of the following:  difficulty breathing or increased cough     Notify your health care provider if you experience any of the  following:  severe persistent headache     Notify your health care provider if you experience any of the following:  worsening rash     Notify your health care provider if you experience any of the following:  persistent dizziness, light-headedness, or visual disturbances     Notify your health care provider if you experience any of the following:  increased confusion or weakness     Medications:  Reconciled Home Medications:      Medication List      START taking these medications    oxyCODONE 10 mg Tab immediate release tablet  Commonly known as: ROXICODONE  Take 1 tablet (10 mg total) by mouth every 4 (four) hours as needed for Pain.        CONTINUE taking these medications    pregabalin 75 MG capsule  Commonly known as: LYRICA  Take 75 mg by mouth.        STOP taking these medications    neomycin 500 mg Tab  Commonly known as: MYCIFRADIN     polyethylene glycol 17 gram/dose powder  Commonly known as: GLYCOLAX            Rosa Maria Bay NP  Colorectal Surgery  [facility]

## 2022-06-30 RX ORDER — MIDAZOLAM HYDROCHLORIDE 1 MG/ML
INJECTION, SOLUTION INTRAMUSCULAR; INTRAVENOUS
Status: DISCONTINUED | OUTPATIENT
Start: 2022-06-27 | End: 2022-06-30

## 2022-07-01 ENCOUNTER — TELEPHONE (OUTPATIENT)
Dept: SURGERY | Facility: CLINIC | Age: 47
End: 2022-07-01
Payer: MEDICARE

## 2022-07-01 NOTE — TELEPHONE ENCOUNTER
----- Message from Bailey Mcneal sent at 7/1/2022 10:59 AM CDT -----  Art Johnson calling regarding Appointment Access  (message) want to know if you have an earlier appt on 07/19/22.  They will need to change both post op appts.  He stated they need to make it home before 5 for their younger child.  call back [phone]

## 2022-07-01 NOTE — TELEPHONE ENCOUNTER
Spoke with patient, appt rescheduled. Patient needs to drop off daughter for 8 am at the [employer] and pick her up for 5p. Patient lives about 3 hours away.

## 2022-07-05 ENCOUNTER — PATIENT MESSAGE (OUTPATIENT)
Dept: SURGERY | Facility: CLINIC | Age: 47
End: 2022-07-05
Payer: MEDICARE

## 2022-07-05 ENCOUNTER — TELEPHONE (OUTPATIENT)
Dept: SURGERY | Facility: CLINIC | Age: 47
End: 2022-07-05
Payer: MEDICARE

## 2022-07-05 ENCOUNTER — TELEPHONE (OUTPATIENT)
Dept: WOUND CARE | Facility: CLINIC | Age: 47
End: 2022-07-05
Payer: MEDICARE

## 2022-07-05 LAB
FINAL PATHOLOGIC DIAGNOSIS: NORMAL
Lab: NORMAL

## 2022-07-05 NOTE — TELEPHONE ENCOUNTER
----- Message from Shanda Winslow sent at 7/5/2022  9:04 AM CDT -----  Regarding: Pt Inquiry  Pt wife, called and advised she can't keep a bag on her  and it keeps leaking. Wife, advised  is ready to give up and they don't know what to do. Requesting a call back from Steph.        [phone] (Rosanna)

## 2022-07-05 NOTE — TELEPHONE ENCOUNTER
Spoke with patient's wife regarding ostomy bags and nonadherence. Instructed wife that I will have Micaela or Windy reach out to patient to discuss plan. Wife verbalizes understanding.

## 2022-07-05 NOTE — TELEPHONE ENCOUNTER
Spoke with pt for 25 minutes and he is having leaks with new ileostomy and changing 4-6 times a day and skin is irritated,  I have arranged for deep convex overnight for him as what he has at home [place] [id number] is NOT a good option for his situation.     Will keep in touch as needed, through portal

## 2022-07-05 NOTE — TELEPHONE ENCOUNTER
----- Message from Steph Ramirez RN sent at 7/5/2022  9:00 AM CDT -----  Regarding: FW: Ostomy complications  Contact: Rosanna/wife [phone]    ----- Message -----  From: Chaya Fields  Sent: 7/5/2022   8:44 AM CDT  To: [facility]vin Dougherty Staff  Subject: Ostomy complications                             Calling to speak with nurse regarding ostomy complications. Bag will not stay on and patient's skin is irritated. Please call and advise.

## 2022-07-06 ENCOUNTER — PATIENT MESSAGE (OUTPATIENT)
Dept: SURGERY | Facility: CLINIC | Age: 47
End: 2022-07-06
Payer: MEDICARE

## 2022-07-06 ENCOUNTER — TELEPHONE (OUTPATIENT)
Dept: SURGERY | Facility: CLINIC | Age: 47
End: 2022-07-06
Payer: MEDICARE

## 2022-07-06 NOTE — TELEPHONE ENCOUNTER
Spoke with patient's wife, reports left side, old colostomy site is open and leaks profusely when patient stands up. No pain, fever or blood. Drainage is clear with no smell. Skin around new ileostomy site is degraded and bag are not sticking. Message sent to Micaela, requested patient send photo of leaking colostomy site.

## 2022-07-06 NOTE — TELEPHONE ENCOUNTER
----- Message from Mat Sosa sent at 7/6/2022  2:53 PM CDT -----  Contact: Rosanna ( spouse ) @ [phone]  Caller requesting a return call about the opposite side of body has opened, she wants to know if she can apply butterfly strips it gushes when he stands up,  Please return call to discuss further

## 2022-07-07 ENCOUNTER — PATIENT MESSAGE (OUTPATIENT)
Dept: SURGERY | Facility: CLINIC | Age: 47
End: 2022-07-07
Payer: MEDICARE

## 2022-07-08 ENCOUNTER — PATIENT MESSAGE (OUTPATIENT)
Dept: SURGERY | Facility: CLINIC | Age: 47
End: 2022-07-08
Payer: MEDICARE

## 2022-07-12 ENCOUNTER — OFFICE VISIT (OUTPATIENT)
Dept: SURGERY | Facility: CLINIC | Age: 47
End: 2022-07-12
Payer: MEDICARE

## 2022-07-12 ENCOUNTER — OFFICE VISIT (OUTPATIENT)
Dept: WOUND CARE | Facility: CLINIC | Age: 47
End: 2022-07-12
Payer: MEDICARE

## 2022-07-12 VITALS
WEIGHT: 291 LBS | HEART RATE: 57 BPM | DIASTOLIC BLOOD PRESSURE: 73 MMHG | BODY MASS INDEX: 36.18 KG/M2 | HEIGHT: 75 IN | SYSTOLIC BLOOD PRESSURE: 115 MMHG

## 2022-07-12 DIAGNOSIS — Z85.048 HISTORY OF RECTAL CANCER: Primary | ICD-10-CM

## 2022-07-12 DIAGNOSIS — T81.89XA NON-HEALING SURGICAL WOUND, INITIAL ENCOUNTER: ICD-10-CM

## 2022-07-12 DIAGNOSIS — Z43.2 ATTENTION TO ILEOSTOMY: Primary | ICD-10-CM

## 2022-07-12 DIAGNOSIS — Z93.3 COLOSTOMY IN PLACE: ICD-10-CM

## 2022-07-12 PROCEDURE — 1159F MED LIST DOCD IN RCRD: CPT | Mod: CPTII,S$GLB,, | Performed by: CLINICAL NURSE SPECIALIST

## 2022-07-12 PROCEDURE — 3078F PR MOST RECENT DIASTOLIC BLOOD PRESSURE < 80 MM HG: ICD-10-PCS | Mod: CPTII,S$GLB,, | Performed by: COLON & RECTAL SURGERY

## 2022-07-12 PROCEDURE — 99999 PR PBB SHADOW E&M-EST. PATIENT-LVL III: CPT | Mod: PBBFAC,,, | Performed by: COLON & RECTAL SURGERY

## 2022-07-12 PROCEDURE — 99999 PR PBB SHADOW E&M-EST. PATIENT-LVL III: ICD-10-PCS | Mod: PBBFAC,,, | Performed by: COLON & RECTAL SURGERY

## 2022-07-12 PROCEDURE — 1160F PR REVIEW ALL MEDS BY PRESCRIBER/CLIN PHARMACIST DOCUMENTED: ICD-10-PCS | Mod: CPTII,S$GLB,, | Performed by: COLON & RECTAL SURGERY

## 2022-07-12 PROCEDURE — 1159F MED LIST DOCD IN RCRD: CPT | Mod: CPTII,S$GLB,, | Performed by: COLON & RECTAL SURGERY

## 2022-07-12 PROCEDURE — 3008F PR BODY MASS INDEX (BMI) DOCUMENTED: ICD-10-PCS | Mod: CPTII,S$GLB,, | Performed by: COLON & RECTAL SURGERY

## 2022-07-12 PROCEDURE — 1160F PR REVIEW ALL MEDS BY PRESCRIBER/CLIN PHARMACIST DOCUMENTED: ICD-10-PCS | Mod: CPTII,S$GLB,, | Performed by: CLINICAL NURSE SPECIALIST

## 2022-07-12 PROCEDURE — 99999 PR PBB SHADOW E&M-EST. PATIENT-LVL II: CPT | Mod: PBBFAC,,, | Performed by: CLINICAL NURSE SPECIALIST

## 2022-07-12 PROCEDURE — 99024 PR POST-OP FOLLOW-UP VISIT: ICD-10-PCS | Mod: S$GLB,,, | Performed by: COLON & RECTAL SURGERY

## 2022-07-12 PROCEDURE — 3078F DIAST BP <80 MM HG: CPT | Mod: CPTII,S$GLB,, | Performed by: COLON & RECTAL SURGERY

## 2022-07-12 PROCEDURE — 99999 PR PBB SHADOW E&M-EST. PATIENT-LVL II: ICD-10-PCS | Mod: PBBFAC,,, | Performed by: CLINICAL NURSE SPECIALIST

## 2022-07-12 PROCEDURE — 1159F PR MEDICATION LIST DOCUMENTED IN MEDICAL RECORD: ICD-10-PCS | Mod: CPTII,S$GLB,, | Performed by: COLON & RECTAL SURGERY

## 2022-07-12 PROCEDURE — 1160F RVW MEDS BY RX/DR IN RCRD: CPT | Mod: CPTII,S$GLB,, | Performed by: CLINICAL NURSE SPECIALIST

## 2022-07-12 PROCEDURE — 99024 POSTOP FOLLOW-UP VISIT: CPT | Mod: S$GLB,,, | Performed by: CLINICAL NURSE SPECIALIST

## 2022-07-12 PROCEDURE — 99024 PR POST-OP FOLLOW-UP VISIT: ICD-10-PCS | Mod: S$GLB,,, | Performed by: CLINICAL NURSE SPECIALIST

## 2022-07-12 PROCEDURE — 1159F PR MEDICATION LIST DOCUMENTED IN MEDICAL RECORD: ICD-10-PCS | Mod: CPTII,S$GLB,, | Performed by: CLINICAL NURSE SPECIALIST

## 2022-07-12 PROCEDURE — 1160F RVW MEDS BY RX/DR IN RCRD: CPT | Mod: CPTII,S$GLB,, | Performed by: COLON & RECTAL SURGERY

## 2022-07-12 PROCEDURE — 3008F BODY MASS INDEX DOCD: CPT | Mod: CPTII,S$GLB,, | Performed by: COLON & RECTAL SURGERY

## 2022-07-12 PROCEDURE — 3074F PR MOST RECENT SYSTOLIC BLOOD PRESSURE < 130 MM HG: ICD-10-PCS | Mod: CPTII,S$GLB,, | Performed by: COLON & RECTAL SURGERY

## 2022-07-12 PROCEDURE — 3074F SYST BP LT 130 MM HG: CPT | Mod: CPTII,S$GLB,, | Performed by: COLON & RECTAL SURGERY

## 2022-07-12 PROCEDURE — 99024 POSTOP FOLLOW-UP VISIT: CPT | Mod: S$GLB,,, | Performed by: COLON & RECTAL SURGERY

## 2022-07-12 RX ORDER — DIPHENOXYLATE HYDROCHLORIDE AND ATROPINE SULFATE 2.5; .025 MG/1; MG/1
2 TABLET ORAL 4 TIMES DAILY PRN
Qty: 240 TABLET | Refills: 5 | Status: SHIPPED | OUTPATIENT
Start: 2022-07-12 | End: 2022-07-22

## 2022-07-12 NOTE — PROGRESS NOTES
This patient is known to me and is here in clinic today for first post op evaluation related to temp ileostomy. Surgery done  by Dr. Lock, convereted to temp ileo but having a lot of issues with this stoma. The patient reports things a bit better than when we talked last week but still skin has rash,  They are following recs of nurse friend who is ostomy nurse and using stoma paste, to skin after skin prep and drying with hair dryer then placing pch wears  [place] soft convex ( large) and stoma is 28 mm or so  He changed this am and not leaking so not disturbed. .    SUPPLIES/DME: he does not know name of company   Patient enrolled in [employer] [facility] Program they had sent overnight samples to him last week on request  He said he liked the deep convex but only has 2.  Gave him convatec and coloplast pouches to take and try along with duoderm extra thin for skin   SPECIAL NEEDS:  Pt counseled on skin care, nutrition, hydration as well as  how to order ostomy supplies.  I spent greater than 50% of this 45 minute visit in face to face counseling.  His old colo site wound has opened and draining mod serosanquinous drg.  No sign of infection

## 2022-07-12 NOTE — PROGRESS NOTES
CRS Office Post Operative Visit    SUBJECTIVE:     Chief Complaint: followup from surgery.     Procedure:   6/27/22  1.  Laparoscopic colostomy takedown with splenic flexure mobilization and stapled end-to-side colorectal anastomosis  2.  Redo pelvic dissection  3.  Laparoscopic omentectomy  4.  Diverting loop ileostomy.  5.  Flexible sigmoidoscopy.     Indication:   Diagnosed with rectal cancer in May 2018.  He initially presented with diarrhea.  He was obstructed at the time of his initial colonoscopy and was therefore treated with emergent laparoscopic loop colostomy.  He then underwent neoadjuvant chemo radiotherapy as well as systemic chemotherapy.  He was sent to the [facility] to have a low anterior resection performed.  Robotic low anterior resection was performed 10/2018.  At that time, anastomosis was not performed due to difficulty getting the colon to reach.  He was therefore given an end colostomy with a Saima stump.  He had additional adjuvant chemotherapy.     From a tumor standpoint, he has done well.  He is followed by [facility] Cardona for oncology.  Recent tumor markers and CT scans negative for metastatic or recurrent disease.  In March 2021, he developed peristomal hernia.  Laparoscopic parastomal hernia repair was performed.  At that time, his pelvis was noted to be scarred and the rectal stump unable to be identified.  Therefore, keyhole repair with a mesh was done laparoscopically.     He has a history of a prior laparoscopic gastric band with resultant 300 lb weight loss.  He has no open abdominal incisions.  No lower abdominal incisions.  His understanding of his low anterior resection was that the surgeon took out a proximally 5-8 inches of the rectum.     Historically, he had bowel movements twice per day.  No issues with fecal incontinence.  No tobacco abuse.  His weight has been stable over the past several months.    Significant post operative events: did well     Pathology:   A.  "  OMENTUM, REMOVAL:          -Benign adipose tissue with focal serosal adhesions.   B.   COLOSTOMY, COLOSTOMY CLOSURE:          -Benign skin and colonic parenchyma with no pathologic abnormalities,   consistent with colostomy.          -One(1) benign reactive lymph node.   C.   PROXIMAL ANASTOMOTIC DONUT, REMOVAL:          -Benign colonic parenchyma with no pathologic abnormalities,   consistent with anastomotic donut.   D.   DISTAL ANASTOMOTIC DONUT, REMOVAL:          -Benign colonic parenchyma with no pathologic abnormalities,   consistent with anastomotic donut.    Current Status:   7/12/22:  Presents for postoperative visit.  Eating.  Having intermittent difficulty with his ileostomy.  Pain well controlled.  No issues with his Pfannenstiel incision.  Intermittent serous drainage from his prior colostomy site.    Review of Systems:  Review of Systems   Constitutional: Negative for chills, diaphoresis, fever, malaise/fatigue and weight loss.   HENT: Negative for congestion.    Respiratory: Negative for shortness of breath.    Cardiovascular: Negative for chest pain and leg swelling.   Gastrointestinal: Positive for diarrhea. Negative for abdominal pain, blood in stool, constipation, nausea and vomiting.   Genitourinary: Negative for dysuria.   Musculoskeletal: Negative for back pain and myalgias.   Skin: Negative for rash.   Neurological: Negative for dizziness and weakness.   Endo/Heme/Allergies: Does not bruise/bleed easily.   Psychiatric/Behavioral: Negative for depression.       OBJECTIVE:     Vital Signs (Most Recent)  /73 (BP Location: Right arm, Patient Position: Sitting, BP Method: Large (Automatic))   Pulse (!) 57   Ht 6' 3" (1.905 m)   Wt 132 kg (291 lb 0.1 oz)   BMI 36.37 kg/m²     Physical Exam:  General: White male in no distress   Respiratory: respirations are even and unlabored  Cardiac: regular rate  Abdomen:  Colostomy site in the left upper quadrant is healing well.  A small amount of " serous fluid drained.  Pfannenstiel incision completely healed.  Ileostomy in the right mid abdomen is pink.  Liquid stool in the appliance.  Extremities: Warm dry and intact  Anorectal:  Deferred    ASSESSMENT/PLAN:     Diagnoses and all orders for this visit:    History of rectal cancer  -     FL Barium Enema Water Soluble; Future    Colostomy in place  -     FL Barium Enema Water Soluble; Future        47 y.o. male post op from laparoscopic redo pelvic dissection with colostomy takedown an low colorectal anastomosis on 06/27/2022.  He is overall healing well.  Plan to see him back in 4 weeks with Gastrografin enema.  As long as Gastrografin enema demonstrates no evidence of anastomotic leak, would plan for ileostomy closure.    BROOKLYN Lock MD, FACS  Staff Surgeon  Colon & Rectal Surgery

## 2022-08-15 ENCOUNTER — TELEPHONE (OUTPATIENT)
Dept: SURGERY | Facility: CLINIC | Age: 47
End: 2022-08-15
Payer: MEDICARE

## 2022-08-16 ENCOUNTER — OFFICE VISIT (OUTPATIENT)
Dept: SURGERY | Facility: CLINIC | Age: 47
End: 2022-08-16
Payer: MEDICARE

## 2022-08-16 ENCOUNTER — HOSPITAL ENCOUNTER (OUTPATIENT)
Dept: RADIOLOGY | Facility: HOSPITAL | Age: 47
Discharge: HOME | End: 2022-08-16
Attending: COLON & RECTAL SURGERY
Payer: MEDICARE

## 2022-08-16 VITALS
WEIGHT: 284.81 LBS | HEART RATE: 60 BPM | SYSTOLIC BLOOD PRESSURE: 122 MMHG | HEIGHT: 75 IN | BODY MASS INDEX: 35.41 KG/M2 | DIASTOLIC BLOOD PRESSURE: 82 MMHG

## 2022-08-16 DIAGNOSIS — Z93.2 ILEOSTOMY IN PLACE: Primary | ICD-10-CM

## 2022-08-16 DIAGNOSIS — Z93.3 COLOSTOMY IN PLACE: ICD-10-CM

## 2022-08-16 DIAGNOSIS — Z85.048 HISTORY OF RECTAL CANCER: ICD-10-CM

## 2022-08-16 PROCEDURE — 1159F MED LIST DOCD IN RCRD: CPT | Mod: CPTII,S$GLB,, | Performed by: COLON & RECTAL SURGERY

## 2022-08-16 PROCEDURE — 3079F DIAST BP 80-89 MM HG: CPT | Mod: CPTII,S$GLB,, | Performed by: COLON & RECTAL SURGERY

## 2022-08-16 PROCEDURE — 99024 PR POST-OP FOLLOW-UP VISIT: ICD-10-PCS | Mod: S$GLB,,, | Performed by: COLON & RECTAL SURGERY

## 2022-08-16 PROCEDURE — 3074F PR MOST RECENT SYSTOLIC BLOOD PRESSURE < 130 MM HG: ICD-10-PCS | Mod: CPTII,S$GLB,, | Performed by: COLON & RECTAL SURGERY

## 2022-08-16 PROCEDURE — 3008F BODY MASS INDEX DOCD: CPT | Mod: CPTII,S$GLB,, | Performed by: COLON & RECTAL SURGERY

## 2022-08-16 PROCEDURE — 3008F PR BODY MASS INDEX (BMI) DOCUMENTED: ICD-10-PCS | Mod: CPTII,S$GLB,, | Performed by: COLON & RECTAL SURGERY

## 2022-08-16 PROCEDURE — 99999 PR PBB SHADOW E&M-EST. PATIENT-LVL III: ICD-10-PCS | Mod: PBBFAC,,, | Performed by: COLON & RECTAL SURGERY

## 2022-08-16 PROCEDURE — 99999 PR PBB SHADOW E&M-EST. PATIENT-LVL III: CPT | Mod: PBBFAC,,, | Performed by: COLON & RECTAL SURGERY

## 2022-08-16 PROCEDURE — 3074F SYST BP LT 130 MM HG: CPT | Mod: CPTII,S$GLB,, | Performed by: COLON & RECTAL SURGERY

## 2022-08-16 PROCEDURE — 25500020 PHARM REV CODE 255: Performed by: COLON & RECTAL SURGERY

## 2022-08-16 PROCEDURE — 1160F PR REVIEW ALL MEDS BY PRESCRIBER/CLIN PHARMACIST DOCUMENTED: ICD-10-PCS | Mod: CPTII,S$GLB,, | Performed by: COLON & RECTAL SURGERY

## 2022-08-16 PROCEDURE — 74270 FL GASTROGRAFIN ENEMA WATER SOLUBLE: ICD-10-PCS | Mod: 26,,, | Performed by: STUDENT IN AN ORGANIZED HEALTH CARE EDUCATION/TRAINING PROGRAM

## 2022-08-16 PROCEDURE — 3079F PR MOST RECENT DIASTOLIC BLOOD PRESSURE 80-89 MM HG: ICD-10-PCS | Mod: CPTII,S$GLB,, | Performed by: COLON & RECTAL SURGERY

## 2022-08-16 PROCEDURE — 1159F PR MEDICATION LIST DOCUMENTED IN MEDICAL RECORD: ICD-10-PCS | Mod: CPTII,S$GLB,, | Performed by: COLON & RECTAL SURGERY

## 2022-08-16 PROCEDURE — 99024 POSTOP FOLLOW-UP VISIT: CPT | Mod: S$GLB,,, | Performed by: COLON & RECTAL SURGERY

## 2022-08-16 PROCEDURE — 74270 X-RAY XM COLON 1CNTRST STD: CPT | Mod: TC

## 2022-08-16 PROCEDURE — 74270 X-RAY XM COLON 1CNTRST STD: CPT | Mod: 26,,, | Performed by: STUDENT IN AN ORGANIZED HEALTH CARE EDUCATION/TRAINING PROGRAM

## 2022-08-16 PROCEDURE — 1160F RVW MEDS BY RX/DR IN RCRD: CPT | Mod: CPTII,S$GLB,, | Performed by: COLON & RECTAL SURGERY

## 2022-08-16 RX ORDER — GABAPENTIN 300 MG/1
300 CAPSULE ORAL 3 TIMES DAILY
Status: CANCELLED | OUTPATIENT
Start: 2022-08-16

## 2022-08-16 RX ADMIN — DIATRIZOATE MEGLUMINE AND DIATRIZOATE SODIUM 240 ML: 660; 100 LIQUID ORAL; RECTAL at 11:08

## 2022-08-16 NOTE — H&P (VIEW-ONLY)
CRS Office Post Operative Visit    SUBJECTIVE:     Chief Complaint: followup from surgery.     Procedure:   6/27/22  1.  Laparoscopic colostomy takedown with splenic flexure mobilization and stapled end-to-side colorectal anastomosis  2.  Redo pelvic dissection  3.  Laparoscopic omentectomy  4.  Diverting loop ileostomy.  5.  Flexible sigmoidoscopy.     Indication:   Diagnosed with rectal cancer in May 2018.  He initially presented with diarrhea.  He was obstructed at the time of his initial colonoscopy and was therefore treated with emergent laparoscopic loop colostomy.  He then underwent neoadjuvant chemo radiotherapy as well as systemic chemotherapy.  He was sent to the [facility] to have a low anterior resection performed.  Robotic low anterior resection was performed 10/2018.  At that time, anastomosis was not performed due to difficulty getting the colon to reach.  He was therefore given an end colostomy with a Saima stump.  He had additional adjuvant chemotherapy.     From a tumor standpoint, he has done well.  He is followed by Liu Cardona for oncology.  Recent tumor markers and CT scans negative for metastatic or recurrent disease.  In March 2021, he developed peristomal hernia.  Laparoscopic parastomal hernia repair was performed.  At that time, his pelvis was noted to be scarred and the rectal stump unable to be identified.  Therefore, keyhole repair with a mesh was done laparoscopically.     He has a history of a prior laparoscopic gastric band with resultant 300 lb weight loss.  He has no open abdominal incisions.  No lower abdominal incisions.  His understanding of his low anterior resection was that the surgeon took out a proximally 5-8 inches of the rectum.     Historically, he had bowel movements twice per day.  No issues with fecal incontinence.  No tobacco abuse.  His weight has been stable over the past several months.    Significant post operative events: did well     Pathology:   A.  "  OMENTUM, REMOVAL:          -Benign adipose tissue with focal serosal adhesions.   B.   COLOSTOMY, COLOSTOMY CLOSURE:          -Benign skin and colonic parenchyma with no pathologic abnormalities,   consistent with colostomy.          -One(1) benign reactive lymph node.   C.   PROXIMAL ANASTOMOTIC DONUT, REMOVAL:          -Benign colonic parenchyma with no pathologic abnormalities,   consistent with anastomotic donut.   D.   DISTAL ANASTOMOTIC DONUT, REMOVAL:          -Benign colonic parenchyma with no pathologic abnormalities,   consistent with anastomotic donut.    Current Status:   7/12/22:  Presents for postoperative visit.  Eating.  Having intermittent difficulty with his ileostomy.  Pain well controlled.  No issues with his Pfannenstiel incision.  Intermittent serous drainage from his prior colostomy site.  8/16/22:  Presents after his Gastrografin enema was done demonstrating no evidence of anastomotic leak.  He is overall doing well.  Had 1 episode of overflow from his ileostomy was able to have good continence.    Review of Systems:  Review of Systems   Constitutional: Negative for chills, diaphoresis, fever, malaise/fatigue and weight loss.   HENT: Negative for congestion.    Respiratory: Negative for shortness of breath.    Cardiovascular: Negative for chest pain and leg swelling.   Gastrointestinal: Positive for diarrhea. Negative for abdominal pain, blood in stool, constipation, nausea and vomiting.   Genitourinary: Negative for dysuria.   Musculoskeletal: Negative for back pain and myalgias.   Skin: Negative for rash.   Neurological: Negative for dizziness and weakness.   Endo/Heme/Allergies: Does not bruise/bleed easily.   Psychiatric/Behavioral: Negative for depression.       OBJECTIVE:     Vital Signs (Most Recent)  /82 (BP Location: Left arm, Patient Position: Sitting, BP Method: Large (Automatic))   Pulse 60   Ht 6' 3" (1.905 m)   Wt 129.2 kg (284 lb 13.4 oz)   BMI 35.60 kg/m² "     Physical Exam:  General: White male in no distress   Respiratory: respirations are even and unlabored  Cardiac: regular rate  Abdomen:  Colostomy site in the left upper quadrant is healing well.  A small amount of serous fluid drained.  Pfannenstiel incision completely healed.  Ileostomy in the right mid abdomen is pink.  Liquid stool in the appliance.  Extremities: Warm dry and intact  Anorectal:  Deferred    ASSESSMENT/PLAN:     Art was seen today for post-op evaluation.    Diagnoses and all orders for this visit:    Ileostomy in place  -     Case Request Operating Room: CLOSURE, ILEOSTOMY, ERAS low, SIGMOIDOSCOPY, FLEXIBLE        47 y.o. male post op from laparoscopic redo pelvic dissection with colostomy takedown an low colorectal anastomosis on 06/27/2022.  He is overall healing very well.  Gastrografin enema negative for anastomotic leak.  Will plan to proceed with ileostomy closure and flexible sigmoidoscopy.  Consent signed and all questions answered.    BROOKLYN Lock MD, FACS  Staff Surgeon  Colon & Rectal Surgery

## 2022-08-16 NOTE — PROGRESS NOTES
CRS Office Post Operative Visit    SUBJECTIVE:     Chief Complaint: followup from surgery.     Procedure:   6/27/22  1.  Laparoscopic colostomy takedown with splenic flexure mobilization and stapled end-to-side colorectal anastomosis  2.  Redo pelvic dissection  3.  Laparoscopic omentectomy  4.  Diverting loop ileostomy.  5.  Flexible sigmoidoscopy.     Indication:   Diagnosed with rectal cancer in May 2018.  He initially presented with diarrhea.  He was obstructed at the time of his initial colonoscopy and was therefore treated with emergent laparoscopic loop colostomy.  He then underwent neoadjuvant chemo radiotherapy as well as systemic chemotherapy.  He was sent to the [facility] to have a low anterior resection performed.  Robotic low anterior resection was performed 10/2018.  At that time, anastomosis was not performed due to difficulty getting the colon to reach.  He was therefore given an end colostomy with a Saima stump.  He had additional adjuvant chemotherapy.     From a tumor standpoint, he has done well.  He is followed by [facility] Cardona for oncology.  Recent tumor markers and CT scans negative for metastatic or recurrent disease.  In March 2021, he developed peristomal hernia.  Laparoscopic parastomal hernia repair was performed.  At that time, his pelvis was noted to be scarred and the rectal stump unable to be identified.  Therefore, keyhole repair with a mesh was done laparoscopically.     He has a history of a prior laparoscopic gastric band with resultant 300 lb weight loss.  He has no open abdominal incisions.  No lower abdominal incisions.  His understanding of his low anterior resection was that the surgeon took out a proximally 5-8 inches of the rectum.     Historically, he had bowel movements twice per day.  No issues with fecal incontinence.  No tobacco abuse.  His weight has been stable over the past several months.    Significant post operative events: did well     Pathology:   A.  "  OMENTUM, REMOVAL:          -Benign adipose tissue with focal serosal adhesions.   B.   COLOSTOMY, COLOSTOMY CLOSURE:          -Benign skin and colonic parenchyma with no pathologic abnormalities,   consistent with colostomy.          -One(1) benign reactive lymph node.   C.   PROXIMAL ANASTOMOTIC DONUT, REMOVAL:          -Benign colonic parenchyma with no pathologic abnormalities,   consistent with anastomotic donut.   D.   DISTAL ANASTOMOTIC DONUT, REMOVAL:          -Benign colonic parenchyma with no pathologic abnormalities,   consistent with anastomotic donut.    Current Status:   7/12/22:  Presents for postoperative visit.  Eating.  Having intermittent difficulty with his ileostomy.  Pain well controlled.  No issues with his Pfannenstiel incision.  Intermittent serous drainage from his prior colostomy site.  8/16/22:  Presents after his Gastrografin enema was done demonstrating no evidence of anastomotic leak.  He is overall doing well.  Had 1 episode of overflow from his ileostomy was able to have good continence.    Review of Systems:  Review of Systems   Constitutional: Negative for chills, diaphoresis, fever, malaise/fatigue and weight loss.   HENT: Negative for congestion.    Respiratory: Negative for shortness of breath.    Cardiovascular: Negative for chest pain and leg swelling.   Gastrointestinal: Positive for diarrhea. Negative for abdominal pain, blood in stool, constipation, nausea and vomiting.   Genitourinary: Negative for dysuria.   Musculoskeletal: Negative for back pain and myalgias.   Skin: Negative for rash.   Neurological: Negative for dizziness and weakness.   Endo/Heme/Allergies: Does not bruise/bleed easily.   Psychiatric/Behavioral: Negative for depression.       OBJECTIVE:     Vital Signs (Most Recent)  /82 (BP Location: Left arm, Patient Position: Sitting, BP Method: Large (Automatic))   Pulse 60   Ht 6' 3" (1.905 m)   Wt 129.2 kg (284 lb 13.4 oz)   BMI 35.60 kg/m² " pt. straight cathed as per MD's orders using sterile technique. 2 RNs at bedside. 500 mL of orange-yellow urine obtained. Successful after 1 attempt. Pt. tolerated procedure well.     Physical Exam:  General: White male in no distress   Respiratory: respirations are even and unlabored  Cardiac: regular rate  Abdomen:  Colostomy site in the left upper quadrant is healing well.  A small amount of serous fluid drained.  Pfannenstiel incision completely healed.  Ileostomy in the right mid abdomen is pink.  Liquid stool in the appliance.  Extremities: Warm dry and intact  Anorectal:  Deferred    ASSESSMENT/PLAN:     Art was seen today for post-op evaluation.    Diagnoses and all orders for this visit:    Ileostomy in place  -     Case Request Operating Room: CLOSURE, ILEOSTOMY, ERAS low, SIGMOIDOSCOPY, FLEXIBLE        47 y.o. male post op from laparoscopic redo pelvic dissection with colostomy takedown an low colorectal anastomosis on 06/27/2022.  He is overall healing very well.  Gastrografin enema negative for anastomotic leak.  Will plan to proceed with ileostomy closure and flexible sigmoidoscopy.  Consent signed and all questions answered.    BROOKLYN Lock MD, FACS  Staff Surgeon  Colon & Rectal Surgery

## 2022-08-26 ENCOUNTER — ANESTHESIA EVENT (OUTPATIENT)
Dept: SURGERY | Facility: HOSPITAL | Age: 47
DRG: 331 | End: 2022-08-26
Payer: MEDICARE

## 2022-08-26 ENCOUNTER — PATIENT MESSAGE (OUTPATIENT)
Dept: SURGERY | Facility: CLINIC | Age: 47
End: 2022-08-26
Payer: MEDICARE

## 2022-08-26 ENCOUNTER — TELEPHONE (OUTPATIENT)
Dept: SURGERY | Facility: CLINIC | Age: 47
End: 2022-08-26
Payer: MEDICARE

## 2022-08-28 RX ORDER — SODIUM CHLORIDE 0.9 % (FLUSH) 0.9 %
10 SYRINGE (ML) INJECTION
Status: CANCELLED | OUTPATIENT
Start: 2022-08-28

## 2022-08-28 NOTE — ANESTHESIA PREPROCEDURE EVALUATION
[facility]  Anesthesia Pre-Operative Evaluation        Patient Name: Art Carrillo  YOB: 1975  MRN: [id number]    SUBJECTIVE:     Pre-operative Evaluation for Procedure(s) (LRB):  CLOSURE, ILEOSTOMY, ERAS low (N/A)  SIGMOIDOSCOPY, FLEXIBLE (N/A)     08/28/2022    Art Carrillo is a 47 y.o. male with a PMHx significant for Rectal cancer and obesity (BMI 35.6).     He now presents for the above procedure(s).    Pertinent Cardiac Studies:  TTE  [facility] [place]    No results found for this or any previous visit.   No results found for this or any previous visit.         Previous Airway:   6/27/2022 8:29 AM  Performed by: Nathanael Langley MD  Authorized by: Nathanael Langley MD      Intubation:     Induction:  Intravenous    Intubated:  Postinduction    Mask Ventilation:  Easy mask    Attempts:  1    Attempted By:  Staff anesthesiologist    Method of Intubation:  Direct    Blade:  Martell 2    Laryngeal View Grade: Grade I - full view of cords      Difficult Airway Encountered?: No      Complications:  None    Airway Device:  Oral endotracheal tube    Airway Device Size:  7.5    Style/Cuff Inflation:  Cuffed    Inflation Amount (mL):  10    Tube secured:  24    Secured at:  The lips    Placement Verified By:  Capnometry    Complicating Factors:  None    Findings Post-Intubation:  BS equal bilateral               Patient Active Problem List   Diagnosis    Rectal cancer       Review of patient's allergies indicates:  No Known Allergies    Current Outpatient Medications   Medication Instructions    oxyCODONE (ROXICODONE) 10 mg, Oral, Every 4 hours PRN    pregabalin (LYRICA) 75 mg, Oral       Past Surgical History:   Procedure Laterality Date    FLEXIBLE SIGMOIDOSCOPY N/A 6/27/2022    Procedure: SIGMOIDOSCOPY, FLEXIBLE;  Surgeon: BROOKLYN Lock MD;  Location: [facility] OR [address] [place] [address];  Service: Colon and Rectal;  Laterality: N/A;    ILEOSTOMY N/A 6/27/2022    Procedure: CREATION,  ILEOSTOMY;  Surgeon: BROOKLYN Lock MD;  [facility];  Service: Colon and Rectal;  Laterality: N/A;    INSERTION OF URETERAL CATHETER Bilateral 6/27/2022    Procedure: INSERTION, CATHETER, URETER;  Surgeon: Edgardo Rdz MD;  [facility];  Service: Urology;  Laterality: Bilateral;    LAPAROSCOPIC GASTRIC BANDING  10/2009    LAPAROSCOPIC SURGICAL REMOVAL OF OMENTUM N/A 6/27/2022    Procedure: RESECTION, OMENTUM, LAPAROSCOPIC;  Surgeon: BROOKLYN Lock MD;  [facility];  Service: Colon and Rectal;  Laterality: N/A;    MOBILIZATION OF SPLENIC FLEXURE N/A 6/27/2022    Procedure: MOBILIZATION, SPLENIC FLEXURE;  Surgeon: BROOKLYN Lock MD;  [facility];  Service: Colon and Rectal;  Laterality: N/A;    PARASTOMAL HERNIA REPAIR  03/2021    ROBOT-ASSISTED LOW ANTERIOR RESECTION OF COLON      URETHRAL CATHETERIZATION  6/27/2022    Procedure: CATHETERIZATION, URETHRA;  Surgeon: Edgardo Rdz MD;  [facility];  Service: Urology;;       Social History     Substance and Sexual Activity   Drug Use Not on file     Alcohol Use: Not on file     Tobacco Use: Low Risk     Smoking Tobacco Use: Never    Smokeless Tobacco Use: Never       OBJECTIVE:     Vital Signs Range (Last 24H):         Significant Labs    Heme Profile  Lab Results   Component Value Date    WBC 6.2 08/04/2022    HGB 14.3 08/04/2022    HCT 44.8 08/04/2022     (H) 08/04/2022       Coagulation Studies  No results found for: LABPROT, INR, APTT    BMP  Lab Results   Component Value Date     08/04/2022    K 4.4 08/04/2022     08/04/2022    CO2 29 08/04/2022    BUN 10 08/04/2022    CREATININE 1.13 08/04/2022    MG 2.2 06/28/2022    PHOS 2.8 06/28/2022       Liver Function Tests  Lab Results   Component Value Date    AST 19 08/04/2022    ALT 12 08/04/2022    ALKPHOS 80 08/04/2022    ALBUMIN 4.0 08/04/2022       Lipid Profile  No results found for: CHOL,  HDL, LDLDIRECT, TRIG    Endocrine Profile  No results found for: HGBA1C, TSH    Diagnostic Studies    Cardiac Studies    EKG:   No results found for this or any previous visit.    TTE:  No results found for this or any previous visit.      TERENCE:  No results found for this or any previous visit.      ASSESSMENT/PLAN:         Pre-op Assessment    I have reviewed the Patient Summary Reports.     I have reviewed the Nursing Notes. I have reviewed the NPO Status.   I have reviewed the Medications.     Review of Systems  Anesthesia Hx:  Denies Hx of Anesthetic complications    Social:  Non-Smoker    Hematology/Oncology:  Hematology Normal      Current/Recent Cancer. surgery and chemotherapy   EENT/Dental:EENT/Dental Normal   Cardiovascular:  Cardiovascular Normal     Pulmonary:  Pulmonary Normal    Renal/:  Renal/ Normal     Hepatic/GI:  Hepatic/GI Normal    Musculoskeletal:  Musculoskeletal Normal    Neurological:  Neurology Normal    Endocrine:  Endocrine Normal  Obesity / BMI > 30  Psych:  Psychiatric Normal              Anesthesia Plan  Type of Anesthesia, risks & benefits discussed:    Anesthesia Type: Gen ETT  Intra-op Monitoring Plan: Standard ASA Monitors  Post Op Pain Control Plan: multimodal analgesia and IV/PO Opioids PRN  Induction:  IV  Airway Plan: Direct, Post-Induction  Informed Consent: Informed consent signed with the Patient and all parties understand the risks and agree with anesthesia plan.  All questions answered.   ASA Score: 3  Day of Surgery Review of History & Physical: H&P Update referred to the surgeon/provider.    Ready For Surgery From Anesthesia Perspective.     .

## 2022-08-29 ENCOUNTER — HOSPITAL ENCOUNTER (INPATIENT)
Facility: HOSPITAL | Age: 47
LOS: 2 days | Discharge: HOME | DRG: 331 | End: 2022-08-31
Attending: COLON & RECTAL SURGERY | Admitting: COLON & RECTAL SURGERY
Payer: MEDICARE

## 2022-08-29 ENCOUNTER — ANESTHESIA (OUTPATIENT)
Dept: SURGERY | Facility: HOSPITAL | Age: 47
DRG: 331 | End: 2022-08-29
Payer: MEDICARE

## 2022-08-29 DIAGNOSIS — Z93.2 ILEOSTOMY IN PLACE: ICD-10-CM

## 2022-08-29 LAB
ABO + RH BLD: NORMAL
BLD GP AB SCN CELLS X3 SERPL QL: NORMAL
CTP QC/QA: YES
SARS-COV-2 AG RESP QL IA.RAPID: NEGATIVE

## 2022-08-29 PROCEDURE — D9220A PRA ANESTHESIA: ICD-10-PCS | Mod: ,,, | Performed by: ANESTHESIOLOGY

## 2022-08-29 PROCEDURE — 88305 TISSUE EXAM BY PATHOLOGIST: CPT | Performed by: PATHOLOGY

## 2022-08-29 PROCEDURE — 36000707: Performed by: COLON & RECTAL SURGERY

## 2022-08-29 PROCEDURE — 71000015 HC POSTOP RECOV 1ST HR: Performed by: COLON & RECTAL SURGERY

## 2022-08-29 PROCEDURE — 45330 PR SIGMOIDOSCOPY,DIAG2STIC: ICD-10-PCS | Mod: 51,,, | Performed by: COLON & RECTAL SURGERY

## 2022-08-29 PROCEDURE — 36000706: Performed by: COLON & RECTAL SURGERY

## 2022-08-29 PROCEDURE — 63600175 PHARM REV CODE 636 W HCPCS

## 2022-08-29 PROCEDURE — 37000008 HC ANESTHESIA 1ST 15 MINUTES: Performed by: COLON & RECTAL SURGERY

## 2022-08-29 PROCEDURE — 99900035 HC TECH TIME PER 15 MIN (STAT)

## 2022-08-29 PROCEDURE — 27201423 OPTIME MED/SURG SUP & DEVICES STERILE SUPPLY: Performed by: COLON & RECTAL SURGERY

## 2022-08-29 PROCEDURE — 71000016 HC POSTOP RECOV ADDL HR: Performed by: COLON & RECTAL SURGERY

## 2022-08-29 PROCEDURE — 86901 BLOOD TYPING SEROLOGIC RH(D): CPT | Performed by: NURSE PRACTITIONER

## 2022-08-29 PROCEDURE — 25000003 PHARM REV CODE 250: Performed by: NURSE PRACTITIONER

## 2022-08-29 PROCEDURE — 94761 N-INVAS EAR/PLS OXIMETRY MLT: CPT

## 2022-08-29 PROCEDURE — 25000003 PHARM REV CODE 250: Performed by: STUDENT IN AN ORGANIZED HEALTH CARE EDUCATION/TRAINING PROGRAM

## 2022-08-29 PROCEDURE — 88305 TISSUE EXAM BY PATHOLOGIST: CPT | Mod: 26,,, | Performed by: PATHOLOGY

## 2022-08-29 PROCEDURE — 25000003 PHARM REV CODE 250: Performed by: COLON & RECTAL SURGERY

## 2022-08-29 PROCEDURE — 63600175 PHARM REV CODE 636 W HCPCS: Performed by: NURSE PRACTITIONER

## 2022-08-29 PROCEDURE — 44625 PR CLOSE ENTEROSTOMY,RESEC+ANAST: ICD-10-PCS | Mod: ,,, | Performed by: COLON & RECTAL SURGERY

## 2022-08-29 PROCEDURE — 94799 UNLISTED PULMONARY SVC/PX: CPT

## 2022-08-29 PROCEDURE — 20600001 HC STEP DOWN PRIVATE ROOM

## 2022-08-29 PROCEDURE — 44625 REPAIR BOWEL OPENING: CPT | Mod: ,,, | Performed by: COLON & RECTAL SURGERY

## 2022-08-29 PROCEDURE — D9220A PRA ANESTHESIA: Mod: ,,, | Performed by: ANESTHESIOLOGY

## 2022-08-29 PROCEDURE — 71000033 HC RECOVERY, INTIAL HOUR: Performed by: COLON & RECTAL SURGERY

## 2022-08-29 PROCEDURE — S0030 INJECTION, METRONIDAZOLE: HCPCS | Performed by: NURSE PRACTITIONER

## 2022-08-29 PROCEDURE — 63600175 PHARM REV CODE 636 W HCPCS: Performed by: STUDENT IN AN ORGANIZED HEALTH CARE EDUCATION/TRAINING PROGRAM

## 2022-08-29 PROCEDURE — 45330 DIAGNOSTIC SIGMOIDOSCOPY: CPT | Mod: 51,,, | Performed by: COLON & RECTAL SURGERY

## 2022-08-29 PROCEDURE — 25000003 PHARM REV CODE 250

## 2022-08-29 PROCEDURE — 88305 TISSUE EXAM BY PATHOLOGIST: ICD-10-PCS | Mod: 26,,, | Performed by: PATHOLOGY

## 2022-08-29 PROCEDURE — 37000009 HC ANESTHESIA EA ADD 15 MINS: Performed by: COLON & RECTAL SURGERY

## 2022-08-29 RX ORDER — SODIUM CHLORIDE 9 MG/ML
INJECTION, SOLUTION INTRAVENOUS
Status: DISCONTINUED | OUTPATIENT
Start: 2022-08-29 | End: 2022-08-29

## 2022-08-29 RX ORDER — ALVIMOPAN 12 MG/1
12 CAPSULE ORAL 2 TIMES DAILY
Status: DISCONTINUED | OUTPATIENT
Start: 2022-08-29 | End: 2022-08-29

## 2022-08-29 RX ORDER — KETAMINE HCL IN 0.9 % NACL 50 MG/5 ML
SYRINGE (ML) INTRAVENOUS
Status: DISCONTINUED | OUTPATIENT
Start: 2022-08-29 | End: 2022-08-29

## 2022-08-29 RX ORDER — HEPARIN SODIUM 5000 [USP'U]/ML
5000 INJECTION, SOLUTION INTRAVENOUS; SUBCUTANEOUS EVERY 8 HOURS
Status: COMPLETED | OUTPATIENT
Start: 2022-08-29 | End: 2022-08-29

## 2022-08-29 RX ORDER — ONDANSETRON 2 MG/ML
4 INJECTION INTRAMUSCULAR; INTRAVENOUS EVERY 6 HOURS PRN
Status: DISCONTINUED | OUTPATIENT
Start: 2022-08-29 | End: 2022-08-31 | Stop reason: HOSPADM

## 2022-08-29 RX ORDER — LIDOCAINE HYDROCHLORIDE 10 MG/ML
INJECTION, SOLUTION INTRAVENOUS
Status: DISCONTINUED | OUTPATIENT
Start: 2022-08-29 | End: 2022-08-29

## 2022-08-29 RX ORDER — DEXAMETHASONE SODIUM PHOSPHATE 4 MG/ML
INJECTION, SOLUTION INTRA-ARTICULAR; INTRALESIONAL; INTRAMUSCULAR; INTRAVENOUS; SOFT TISSUE
Status: DISCONTINUED | OUTPATIENT
Start: 2022-08-29 | End: 2022-08-29

## 2022-08-29 RX ORDER — HALOPERIDOL 5 MG/ML
0.5 INJECTION INTRAMUSCULAR EVERY 10 MIN PRN
Status: DISCONTINUED | OUTPATIENT
Start: 2022-08-29 | End: 2022-08-29 | Stop reason: HOSPADM

## 2022-08-29 RX ORDER — ACETAMINOPHEN 650 MG/20.3ML
975 LIQUID ORAL
Status: COMPLETED | OUTPATIENT
Start: 2022-08-29 | End: 2022-08-29

## 2022-08-29 RX ORDER — SODIUM CHLORIDE 9 MG/ML
INJECTION, SOLUTION INTRAVENOUS CONTINUOUS
Status: DISCONTINUED | OUTPATIENT
Start: 2022-08-29 | End: 2022-08-29

## 2022-08-29 RX ORDER — FENTANYL CITRATE 50 UG/ML
INJECTION, SOLUTION INTRAMUSCULAR; INTRAVENOUS
Status: DISCONTINUED | OUTPATIENT
Start: 2022-08-29 | End: 2022-08-29

## 2022-08-29 RX ORDER — OXYCODONE HYDROCHLORIDE 10 MG/1
10 TABLET ORAL EVERY 4 HOURS PRN
Status: DISCONTINUED | OUTPATIENT
Start: 2022-08-29 | End: 2022-08-31 | Stop reason: HOSPADM

## 2022-08-29 RX ORDER — OXYCODONE HYDROCHLORIDE 5 MG/1
5 TABLET ORAL EVERY 4 HOURS PRN
Status: DISCONTINUED | OUTPATIENT
Start: 2022-08-29 | End: 2022-08-31 | Stop reason: HOSPADM

## 2022-08-29 RX ORDER — ALVIMOPAN 12 MG/1
12 CAPSULE ORAL 2 TIMES DAILY
Status: DISCONTINUED | OUTPATIENT
Start: 2022-08-29 | End: 2022-08-31 | Stop reason: HOSPADM

## 2022-08-29 RX ORDER — MIDAZOLAM HYDROCHLORIDE 1 MG/ML
INJECTION, SOLUTION INTRAMUSCULAR; INTRAVENOUS
Status: DISCONTINUED | OUTPATIENT
Start: 2022-08-29 | End: 2022-08-29

## 2022-08-29 RX ORDER — GABAPENTIN 300 MG/1
300 CAPSULE ORAL
Status: COMPLETED | OUTPATIENT
Start: 2022-08-29 | End: 2022-08-29

## 2022-08-29 RX ORDER — IBUPROFEN 400 MG/1
800 TABLET ORAL EVERY 8 HOURS
Status: DISCONTINUED | OUTPATIENT
Start: 2022-08-30 | End: 2022-08-31 | Stop reason: HOSPADM

## 2022-08-29 RX ORDER — SODIUM CHLORIDE 0.9 % (FLUSH) 0.9 %
10 SYRINGE (ML) INJECTION
Status: DISCONTINUED | OUTPATIENT
Start: 2022-08-29 | End: 2022-08-31 | Stop reason: HOSPADM

## 2022-08-29 RX ORDER — SODIUM CHLORIDE 9 MG/ML
INJECTION, SOLUTION INTRAVENOUS CONTINUOUS
Status: DISCONTINUED | OUTPATIENT
Start: 2022-08-29 | End: 2022-08-30

## 2022-08-29 RX ORDER — GABAPENTIN 300 MG/1
300 CAPSULE ORAL 3 TIMES DAILY
Status: DISCONTINUED | OUTPATIENT
Start: 2022-08-29 | End: 2022-08-31 | Stop reason: HOSPADM

## 2022-08-29 RX ORDER — PROPOFOL 10 MG/ML
VIAL (ML) INTRAVENOUS
Status: DISCONTINUED | OUTPATIENT
Start: 2022-08-29 | End: 2022-08-29

## 2022-08-29 RX ORDER — ACETAMINOPHEN 10 MG/ML
1000 INJECTION, SOLUTION INTRAVENOUS EVERY 8 HOURS
Status: DISPENSED | OUTPATIENT
Start: 2022-08-29 | End: 2022-08-30

## 2022-08-29 RX ORDER — FENTANYL CITRATE 50 UG/ML
25 INJECTION, SOLUTION INTRAMUSCULAR; INTRAVENOUS EVERY 5 MIN PRN
Status: COMPLETED | OUTPATIENT
Start: 2022-08-29 | End: 2022-08-29

## 2022-08-29 RX ORDER — LIDOCAINE HYDROCHLORIDE 10 MG/ML
1 INJECTION, SOLUTION EPIDURAL; INFILTRATION; INTRACAUDAL; PERINEURAL
Status: DISCONTINUED | OUTPATIENT
Start: 2022-08-29 | End: 2022-08-29

## 2022-08-29 RX ORDER — LIDOCAINE HYDROCHLORIDE ANHYDROUS AND DEXTROSE MONOHYDRATE .8; 5 G/100ML; G/100ML
INJECTION, SOLUTION INTRAVENOUS CONTINUOUS PRN
Status: DISCONTINUED | OUTPATIENT
Start: 2022-08-29 | End: 2022-08-29

## 2022-08-29 RX ORDER — TRIPROLIDINE/PSEUDOEPHEDRINE 2.5MG-60MG
600 TABLET ORAL
Status: COMPLETED | OUTPATIENT
Start: 2022-08-29 | End: 2022-08-29

## 2022-08-29 RX ORDER — ACETAMINOPHEN 500 MG
1000 TABLET ORAL EVERY 8 HOURS
Status: DISCONTINUED | OUTPATIENT
Start: 2022-08-30 | End: 2022-08-31 | Stop reason: HOSPADM

## 2022-08-29 RX ORDER — MUPIROCIN 20 MG/G
1 OINTMENT TOPICAL
Status: COMPLETED | OUTPATIENT
Start: 2022-08-29 | End: 2022-08-29

## 2022-08-29 RX ORDER — TRAMADOL HYDROCHLORIDE 50 MG/1
50 TABLET ORAL EVERY 6 HOURS PRN
Status: DISCONTINUED | OUTPATIENT
Start: 2022-08-29 | End: 2022-08-31 | Stop reason: HOSPADM

## 2022-08-29 RX ORDER — ONDANSETRON 2 MG/ML
INJECTION INTRAMUSCULAR; INTRAVENOUS
Status: DISCONTINUED | OUTPATIENT
Start: 2022-08-29 | End: 2022-08-29

## 2022-08-29 RX ORDER — MUPIROCIN 20 MG/G
OINTMENT TOPICAL 2 TIMES DAILY
Status: DISCONTINUED | OUTPATIENT
Start: 2022-08-29 | End: 2022-08-31 | Stop reason: HOSPADM

## 2022-08-29 RX ORDER — METRONIDAZOLE 500 MG/100ML
500 INJECTION, SOLUTION INTRAVENOUS
Status: COMPLETED | OUTPATIENT
Start: 2022-08-29 | End: 2022-08-29

## 2022-08-29 RX ORDER — ROCURONIUM BROMIDE 10 MG/ML
INJECTION, SOLUTION INTRAVENOUS
Status: DISCONTINUED | OUTPATIENT
Start: 2022-08-29 | End: 2022-08-29

## 2022-08-29 RX ORDER — ENOXAPARIN SODIUM 100 MG/ML
40 INJECTION SUBCUTANEOUS EVERY 24 HOURS
Status: DISCONTINUED | OUTPATIENT
Start: 2022-08-29 | End: 2022-08-31 | Stop reason: HOSPADM

## 2022-08-29 RX ADMIN — ROCURONIUM BROMIDE 10 MG: 10 INJECTION, SOLUTION INTRAVENOUS at 10:08

## 2022-08-29 RX ADMIN — GABAPENTIN 300 MG: 300 CAPSULE ORAL at 09:08

## 2022-08-29 RX ADMIN — LIDOCAINE HYDROCHLORIDE 0.02 MG/KG/MIN: 8 INJECTION, SOLUTION INTRAVENOUS at 09:08

## 2022-08-29 RX ADMIN — PROPOFOL 150 MG: 10 INJECTION, EMULSION INTRAVENOUS at 09:08

## 2022-08-29 RX ADMIN — Medication 10 MG: at 10:08

## 2022-08-29 RX ADMIN — FENTANYL CITRATE 25 MCG: 50 INJECTION, SOLUTION INTRAMUSCULAR; INTRAVENOUS at 12:08

## 2022-08-29 RX ADMIN — GABAPENTIN 300 MG: 300 CAPSULE ORAL at 08:08

## 2022-08-29 RX ADMIN — ALVIMOPAN 12 MG: 12 CAPSULE ORAL at 02:08

## 2022-08-29 RX ADMIN — METRONIDAZOLE 500 MG: 500 INJECTION, SOLUTION INTRAVENOUS at 09:08

## 2022-08-29 RX ADMIN — Medication 30 MG: at 09:08

## 2022-08-29 RX ADMIN — IBUPROFEN 800 MG: 800 INJECTION INTRAVENOUS at 09:08

## 2022-08-29 RX ADMIN — LIDOCAINE HYDROCHLORIDE 50 MG: 10 INJECTION, SOLUTION INTRAVENOUS at 09:08

## 2022-08-29 RX ADMIN — SODIUM CHLORIDE: 0.9 INJECTION, SOLUTION INTRAVENOUS at 12:08

## 2022-08-29 RX ADMIN — MUPIROCIN 1 G: 20 OINTMENT TOPICAL at 08:08

## 2022-08-29 RX ADMIN — IBUPROFEN 800 MG: 800 INJECTION INTRAVENOUS at 03:08

## 2022-08-29 RX ADMIN — CEFTRIAXONE 2 G: 2 INJECTION, SOLUTION INTRAVENOUS at 09:08

## 2022-08-29 RX ADMIN — IBUPROFEN 600 MG: 100 SUSPENSION ORAL at 08:08

## 2022-08-29 RX ADMIN — ENOXAPARIN SODIUM 40 MG: 100 INJECTION SUBCUTANEOUS at 05:08

## 2022-08-29 RX ADMIN — MUPIROCIN: 20 OINTMENT TOPICAL at 09:08

## 2022-08-29 RX ADMIN — GABAPENTIN 300 MG: 300 CAPSULE ORAL at 02:08

## 2022-08-29 RX ADMIN — SODIUM CHLORIDE: 9 INJECTION, SOLUTION INTRAVENOUS at 09:08

## 2022-08-29 RX ADMIN — HEPARIN SODIUM 5000 UNITS: 5000 INJECTION INTRAVENOUS; SUBCUTANEOUS at 08:08

## 2022-08-29 RX ADMIN — ONDANSETRON 4 MG: 2 INJECTION INTRAMUSCULAR; INTRAVENOUS at 11:08

## 2022-08-29 RX ADMIN — ONDANSETRON 4 MG: 2 INJECTION INTRAMUSCULAR; INTRAVENOUS at 12:08

## 2022-08-29 RX ADMIN — DEXAMETHASONE SODIUM PHOSPHATE 8 MG: 4 INJECTION INTRA-ARTICULAR; INTRALESIONAL; INTRAMUSCULAR; INTRAVENOUS; SOFT TISSUE at 09:08

## 2022-08-29 RX ADMIN — ACETAMINOPHEN 1000 MG: 10 INJECTION INTRAVENOUS at 09:08

## 2022-08-29 RX ADMIN — MIDAZOLAM 2 MG: 1 INJECTION INTRAMUSCULAR; INTRAVENOUS at 09:08

## 2022-08-29 RX ADMIN — ROCURONIUM BROMIDE 50 MG: 10 INJECTION, SOLUTION INTRAVENOUS at 09:08

## 2022-08-29 RX ADMIN — OXYCODONE 10 MG: 5 TABLET ORAL at 12:08

## 2022-08-29 RX ADMIN — SUGAMMADEX 200 MG: 100 INJECTION, SOLUTION INTRAVENOUS at 11:08

## 2022-08-29 RX ADMIN — FENTANYL CITRATE 100 MCG: 50 INJECTION, SOLUTION INTRAMUSCULAR; INTRAVENOUS at 09:08

## 2022-08-29 RX ADMIN — ACETAMINOPHEN 976.6 MG: 160 SOLUTION ORAL at 08:08

## 2022-08-29 NOTE — ANESTHESIA PROCEDURE NOTES
Intubation    Date/Time: 8/29/2022 9:36 AM  Performed by: Renato De La Fuente MD  Authorized by: Farzana Loaiza MD     Intubation:     Induction:  Intravenous    Intubated:  Postinduction    Mask Ventilation:  Easy with oral airway    Attempts:  1    Attempted By:  Resident anesthesiologist    Method of Intubation:  Direct    Blade:  Martell 2    Laryngeal View Grade: Grade I - full view of cords      Difficult Airway Encountered?: No      Complications:  None    Airway Device:  Oral endotracheal tube    Airway Device Size:  7.5    Style/Cuff Inflation:  Cuffed (inflated to minimal occlusive pressure)    Tube secured:  24    Secured at:  The teeth    Placement Verified By:  Capnometry    Complicating Factors:  None    Findings Post-Intubation:  BS equal bilateral and atraumatic/condition of teeth unchanged     Pt can be scheduled to see who ever saw her in the hospital, please find out.

## 2022-08-29 NOTE — PLAN OF CARE
Pt prepped for surgery. Questions answered. Wife at bedside and to take all belongings. Called respiratory for incentive spirometry education.

## 2022-08-29 NOTE — BRIEF OP NOTE
[facility] - Surgery ([facility])  Brief Operative Note    SUMMARY     Surgery Date: 8/29/2022     Surgeon(s) and Role:     * BROOKLYN Roche MD - Primary     * Montana Elizondo MD - Resident - Assisting        Pre-op Diagnosis:  Ileostomy in place [Z93.2]    Post-op Diagnosis:  Post-Op Diagnosis Codes:     * Ileostomy in place [Z93.2]    Procedure(s) (LRB):  CLOSURE, ILEOSTOMY, ERAS low (N/A)  SIGMOIDOSCOPY, FLEXIBLE (N/A)    Anesthesia: General    Operative Findings: Flex sig demonstrating patent colorectal anastomosis. Ileostomy reversal with primary anastomosis of small bowel.     Estimated Blood Loss: 30 cc         Specimens:   Specimen (24h ago, onward)       Start     Ordered    08/29/22 1036  Specimen to Pathology, Surgery General Surgery  Once        Comments: Pre-op Diagnosis: Ileostomy in place [Z93.2]Procedure(s):CLOSURE, ILEOSTOMY, ERAS lowSIGMOIDOSCOPY, FLEXIBLE Number of specimens: 1Name of specimens: #1 iellistomy     References:    Click here for ordering Quick Tip   Question Answer Comment   Procedure Type: General Surgery    Which provider would you like to cc? BROOKLYN ROCHE    Release to patient Immediate        08/29/22 1109                    [id number]

## 2022-08-29 NOTE — ANESTHESIA POSTPROCEDURE EVALUATION
Anesthesia Post Evaluation    Patient: Art Carrillo    Procedure(s) Performed: Procedure(s) (LRB):  CLOSURE, ILEOSTOMY, ERAS low (N/A)  SIGMOIDOSCOPY, FLEXIBLE (N/A)    Final Anesthesia Type: general      Patient location during evaluation: PACU  Patient participation: Yes- Able to Participate  Level of consciousness: awake and alert  Post-procedure vital signs: reviewed and stable  Pain management: adequate  Airway patency: patent    PONV status at discharge: No PONV  Anesthetic complications: no      Cardiovascular status: blood pressure returned to baseline  Respiratory status: unassisted  Follow-up not needed.          Vitals Value Taken Time   /65 08/29/22 1431   Temp 36.7 °C (98.1 °F) 08/29/22 1300   Pulse 55 08/29/22 1436   Resp 16 08/29/22 1436   SpO2 98 % 08/29/22 1436   Vitals shown include unvalidated device data.      Event Time   Out of Recovery 12:15:00         Pain/Jose Alfredo Score: Pain Rating Prior to Med Admin: 8 (8/29/2022 12:20 PM)  Jose Alfredo Score: 9 (8/29/2022 12:05 PM)

## 2022-08-29 NOTE — TRANSFER OF CARE
Anesthesia Transfer of Care Note    Patient: Art Carrillo    Procedure(s) Performed: Procedure(s) (LRB):  CLOSURE, ILEOSTOMY, ERAS low (N/A)  SIGMOIDOSCOPY, FLEXIBLE (N/A)    Patient location: PACU    Anesthesia Type: general    Transport from OR: Transported from OR on 6-10 L/min O2 by face mask with adequate spontaneous ventilation    Post pain: adequate analgesia    Post assessment: no apparent anesthetic complications    Post vital signs: stable    Level of consciousness: awake and alert    Nausea/Vomiting: no nausea/vomiting    Complications: none    Transfer of care protocol was followed      Last vitals:   Visit Vitals  BP (!) 108/54 (BP Location: Right arm, Patient Position: Lying)   Pulse 67   Temp 36.7 °C (98 °F) (Temporal)   Resp 16   Wt 129.3 kg (285 lb)   SpO2 100%   BMI 35.62 kg/m²

## 2022-08-29 NOTE — OP NOTE
ANITA WATERS  [id number]  [id number]    OPERATIVE NOTE:    DATE OF OPERATION: 08/29/2022    PREOPERATIVE DIAGNOSIS:  Unwanted ileostomy    POSTOPERATIVE DIAGNOSIS: ileostomy    PROCEDURE PERFORMED: ileostomy closure with small bowel resection and flexible sigmoidoscopy    ATTENDING SURGEON: BROOKLYN Lock MD    RESIDENT/FELLOW SURGEON: Montana Elizondo MD    ANESTHESIA: General    ESTIMATED BLOOD LOSS:  100 mL    FINDINGS:  1.  Flexible sigmoidoscopy demonstrated slight webbing at the side-to-end Handley style anastomosis.  This was digitally dilated.  Baker style anastomosis then inspected and was circumferentially intact and healthy appearing.  2. Routine ileostomy closure    SPECIMENS:  Ileostomy    COMPLICATIONS:  None apparent    DISPOSITION: PACU    CONDITION: good    INDICATION FOR PROCEDURE:  Anita Waters is a 47 y.o. male with a history of rectal cancer followed by colostomy.  He underwent colostomy takedown 2 months ago with fecal diversion performed at that time secondary to prior radiation.  He presents for ileostomy closure.    DESCRIPTION OF PROCEDURE:   After informed consent was reviewed the patient was taken to the operating room and placed under general anesthesia.  Ceftriaxone and Flagyl were given for preoperative antibiotics.  A Polo catheter was sterilely placed.    Patient was placed in the frog-leg position.  Time-out was performed.  Digital rectal exam was performed and demonstrated slight stenosis at the anus.  The scope was inserted.  Wiping was seen at the colorectal anastomosis.  The scope was removed and repeat digital exam was performed and used to dilate the slight stenosis.  Afterwards, the scope could easily traverse the anastomosis.  The anastomosis appeared circumferentially healthy and intact.  The scope was then withdrawn.  The anterior abdominal wall was prepped and draped in the usual sterile fashion and time-out was performed. Sterile saline was injected at the  mucocutaneous junction for hydrodissection.  The mucocutaneous junction was incised with electrocautery. Sharp dissection was then used to dissect proximally onto the anterior rectus fascia.  The muscles of the rectus were swept away from the bowel in the posterior rectus was  away from the bowel.  There was no injury to the bowel. The ileostomy was circumferentially dissected free from the underlying fascia.  All adhesions were taken down sharply.  He had a significant amount of soft tissue anterior to his rectus muscles.  The bowel was then elevated. The bowel was distended with saline to look for any serosal defects.  One questionable serosal defect was seen on the proximal limb.  The bowel was isolated proximal to the defect in the intervening mesentery was divided between clamps and ligated with 0 Vicryl ties.   Enterotomies were made on the anti mesenteric border of the proximal and distal side of the small bowel.  A CHICHI 75 mm stapler with a blue load was inserted and fired to form a common channel.  The common channel was open inspected and found to be hemostatic. The staple lines were then offset and a 2nd firing of the CHICHI 75 mm stapler with a blue load was used to come across transversely to close the defect as well as amputate the specimen.  Two 3-0 Vicryl sutures were placed at the apex for reinforcement.  The transverse staple line was oversewn with 3 - Vicryl in simple fashion.  The anterior staple line was oversewn with 3-0 Vicryl in Lembert fashion.  The mesenteric defect was reapproximated with a 3-0 Vicryl.  Anastomosis was then gently placed back into the abdomen.  Attention then turned towards closure of abdominal wall defect.  The anterior rectus fascia was identified and  from overlying tissue.  The posterior rectus fascia was grasped and  from the anterior rectus fascia and overlying rectus abdominus muscle.  The posterior rectus fascia was closed with 0 PDS  figure-of-eight sutures  The anterior rectus fascia was then closed with interrupted 0 figure-of-eight PDS sutures.     The wound was irrigated and hemostasis was achieved.   A 3-0 PDS was placed as a pursestring suture and tied down.  The defect was packed with gauze and a sterile dressing was applied.  The patient tolerated the procedure well. There were no apparent intraoperative complications.  All sponge, needle, and instrument counts were correct x2.  The patient was extubated and taken to PACU in stable condition.    BROOKLYN Lock MD, FACS, FASCRS  Staff Surgeon  Colon & Rectal Surgery

## 2022-08-29 NOTE — PLAN OF CARE
Pt aox4 on RA, patient verbalizes understanding of POC.    Problem: Adult Inpatient Plan of Care  Goal: Plan of Care Review  Outcome: Ongoing, Progressing  Goal: Patient-Specific Goal (Individualized)  Outcome: Ongoing, Progressing  Goal: Absence of Hospital-Acquired Illness or Injury  Outcome: Ongoing, Progressing  Goal: Optimal Comfort and Wellbeing  Outcome: Ongoing, Progressing  Goal: Readiness for Transition of Care  Outcome: Ongoing, Progressing     Problem: Infection  Goal: Absence of Infection Signs and Symptoms  Outcome: Ongoing, Progressing     Problem: Fall Injury Risk  Goal: Absence of Fall and Fall-Related Injury  Outcome: Ongoing, Progressing

## 2022-08-29 NOTE — NURSING TRANSFER
Nursing Transfer Note      8/29/2022     Reason patient is being transferred: post op    Transfer To: 1002    Transfer via bed    Transfer with IVF    Transported by pct    Medicines sent: routine    Any special needs or follow-up needed: routine    Chart send with patient: Yes    Notified: spouse    Patient reassessed at: [id number]

## 2022-08-30 PROBLEM — Z98.890 S/P COLOSTOMY TAKEDOWN: Status: ACTIVE | Noted: 2022-08-30

## 2022-08-30 LAB
ANION GAP SERPL CALC-SCNC: 7 MMOL/L (ref 8–16)
BASOPHILS # BLD AUTO: 0.02 K/UL (ref 0–0.2)
BASOPHILS NFR BLD: 0.2 % (ref 0–1.9)
BUN SERPL-MCNC: 11 MG/DL (ref 6–20)
CALCIUM SERPL-MCNC: 8.4 MG/DL (ref 8.7–10.5)
CHLORIDE SERPL-SCNC: 108 MMOL/L (ref 95–110)
CO2 SERPL-SCNC: 23 MMOL/L (ref 23–29)
CREAT SERPL-MCNC: 0.9 MG/DL (ref 0.5–1.4)
DIFFERENTIAL METHOD: ABNORMAL
EOSINOPHIL # BLD AUTO: 0 K/UL (ref 0–0.5)
EOSINOPHIL NFR BLD: 0.2 % (ref 0–8)
ERYTHROCYTE [DISTWIDTH] IN BLOOD BY AUTOMATED COUNT: 14.3 % (ref 11.5–14.5)
EST. GFR  (NO RACE VARIABLE): >60 ML/MIN/1.73 M^2
GLUCOSE SERPL-MCNC: 104 MG/DL (ref 70–110)
HCT VFR BLD AUTO: 41.9 % (ref 40–54)
HGB BLD-MCNC: 13.8 G/DL (ref 14–18)
IMM GRANULOCYTES # BLD AUTO: 0.05 K/UL (ref 0–0.04)
IMM GRANULOCYTES NFR BLD AUTO: 0.5 % (ref 0–0.5)
LYMPHOCYTES # BLD AUTO: 0.6 K/UL (ref 1–4.8)
LYMPHOCYTES NFR BLD: 6.2 % (ref 18–48)
MCH RBC QN AUTO: 27.8 PG (ref 27–31)
MCHC RBC AUTO-ENTMCNC: 32.9 G/DL (ref 32–36)
MCV RBC AUTO: 85 FL (ref 82–98)
MONOCYTES # BLD AUTO: 0.6 K/UL (ref 0.3–1)
MONOCYTES NFR BLD: 6.1 % (ref 4–15)
NEUTROPHILS # BLD AUTO: 8.5 K/UL (ref 1.8–7.7)
NEUTROPHILS NFR BLD: 86.8 % (ref 38–73)
NRBC BLD-RTO: 0 /100 WBC
PLATELET # BLD AUTO: 418 K/UL (ref 150–450)
PMV BLD AUTO: 9 FL (ref 9.2–12.9)
POTASSIUM SERPL-SCNC: 4.5 MMOL/L (ref 3.5–5.1)
RBC # BLD AUTO: 4.96 M/UL (ref 4.6–6.2)
SODIUM SERPL-SCNC: 138 MMOL/L (ref 136–145)
WBC # BLD AUTO: 9.74 K/UL (ref 3.9–12.7)

## 2022-08-30 PROCEDURE — 25000003 PHARM REV CODE 250: Performed by: STUDENT IN AN ORGANIZED HEALTH CARE EDUCATION/TRAINING PROGRAM

## 2022-08-30 PROCEDURE — 85025 COMPLETE CBC W/AUTO DIFF WBC: CPT | Performed by: STUDENT IN AN ORGANIZED HEALTH CARE EDUCATION/TRAINING PROGRAM

## 2022-08-30 PROCEDURE — 80048 BASIC METABOLIC PNL TOTAL CA: CPT | Performed by: STUDENT IN AN ORGANIZED HEALTH CARE EDUCATION/TRAINING PROGRAM

## 2022-08-30 PROCEDURE — 36415 COLL VENOUS BLD VENIPUNCTURE: CPT | Performed by: STUDENT IN AN ORGANIZED HEALTH CARE EDUCATION/TRAINING PROGRAM

## 2022-08-30 PROCEDURE — 97165 OT EVAL LOW COMPLEX 30 MIN: CPT

## 2022-08-30 PROCEDURE — 63600175 PHARM REV CODE 636 W HCPCS: Performed by: STUDENT IN AN ORGANIZED HEALTH CARE EDUCATION/TRAINING PROGRAM

## 2022-08-30 PROCEDURE — 20600001 HC STEP DOWN PRIVATE ROOM

## 2022-08-30 PROCEDURE — 97161 PT EVAL LOW COMPLEX 20 MIN: CPT

## 2022-08-30 PROCEDURE — 25000003 PHARM REV CODE 250: Performed by: COLON & RECTAL SURGERY

## 2022-08-30 PROCEDURE — 97535 SELF CARE MNGMENT TRAINING: CPT

## 2022-08-30 RX ADMIN — ACETAMINOPHEN 1000 MG: 10 INJECTION INTRAVENOUS at 06:08

## 2022-08-30 RX ADMIN — MUPIROCIN: 20 OINTMENT TOPICAL at 09:08

## 2022-08-30 RX ADMIN — GABAPENTIN 300 MG: 300 CAPSULE ORAL at 09:08

## 2022-08-30 RX ADMIN — ALVIMOPAN 12 MG: 12 CAPSULE ORAL at 09:08

## 2022-08-30 RX ADMIN — ENOXAPARIN SODIUM 40 MG: 100 INJECTION SUBCUTANEOUS at 04:08

## 2022-08-30 RX ADMIN — IBUPROFEN 800 MG: 800 INJECTION INTRAVENOUS at 06:08

## 2022-08-30 NOTE — CARE UPDATE
RAPID RESPONSE NURSE ROUND       Rounding completed with charge RNMat for bradycardia and hypotension reports no concerns at this time. No additional concerns verbalized at this time. Instructed to call [phone] for further concerns or assistance.

## 2022-08-30 NOTE — SUBJECTIVE & OBJECTIVE
Subjective:     Interval History: Pt had a colostomy  takedown yesterday, no intraoperative complications. No acute overnight events. Tolerating CLD, denies N/V. Passing flatus, no BM yet. Pain is well controlled.    Post-Op Info:  Procedure(s) (LRB):  CLOSURE, ILEOSTOMY, ERAS low (N/A)  SIGMOIDOSCOPY, FLEXIBLE (N/A)   1 Day Post-Op      Medications:  Continuous Infusions:  Scheduled Meds:   acetaminophen  1,000 mg Intravenous Q8H    Followed by    acetaminophen  1,000 mg Oral Q8H    alvimopan  12 mg Oral BID    enoxaparin  40 mg Subcutaneous Daily    gabapentin  300 mg Oral TID    ibuprofen  800 mg Intravenous Q8H    Followed by    ibuprofen  800 mg Oral Q8H    mupirocin   Nasal BID     PRN Meds:   ondansetron    oxyCODONE    oxyCODONE    sodium chloride 0.9%    traMADoL        Objective:     Vital Signs (Most Recent):  Temp: 97.6 °F (36.4 °C) (08/30/22 0437)  Pulse: (!) 59 (08/30/22 0437)  Resp: 18 (08/30/22 0437)  BP: 133/74 (08/30/22 0437)  SpO2: 96 % (08/30/22 0437)   Vital Signs (24h Range):  Temp:  [96.1 °F (35.6 °C)-98.3 °F (36.8 °C)] 97.6 °F (36.4 °C)  Pulse:  [51-68] 59  Resp:  [6-20] 18  SpO2:  [94 %-100 %] 96 %  BP: (103-133)/(54-76) 133/74     Intake/Output - Last 3 Shifts         08/28 0700 08/29 0659 08/29 0700 08/30 0659    I.V. (mL/kg)  627.3 (4.9)    IV Piggyback  197.6    Total Intake(mL/kg)  824.9 (6.4)    Net  +824.9          Urine Occurrence  1 x    Stool Occurrence  0 x            Physical Exam  Constitutional:       General: He is not in acute distress.     Appearance: Normal appearance.   HENT:      Mouth/Throat:      Mouth: Mucous membranes are moist.   Eyes:      Extraocular Movements: Extraocular movements intact.   Cardiovascular:      Rate and Rhythm: Normal rate and regular rhythm.   Pulmonary:      Effort: Pulmonary effort is normal. No respiratory distress.   Abdominal:      General: There is no distension.      Palpations: Abdomen is soft.      Tenderness: There is abdominal  tenderness (incisional, appropriate postop). There is no guarding.      Comments: Pfannenstiel incision c/d/I  Lap port incisions c/d/i   Skin:     General: Skin is warm and dry.   Neurological:      Mental Status: He is alert and oriented to person, place, and time. Mental status is at baseline.       Significant Labs:  CBC (Last 3 Results):   Recent Labs   Lab 08/30/22  0311   WBC 9.74   RBC 4.96   HGB 13.8*   HCT 41.9      MCV 85   MCH 27.8   MCHC 32.9     CMP (Last 3 Results):   Recent Labs   Lab 08/30/22  0311      CALCIUM 8.4*      K 4.5   CO2 23      BUN 11   CREATININE 0.9     CRP (Last 3 Results): No results for input(s): CRP in the last 168 hours.  Coagulation: No results for input(s): LABPROT, INR, APTT in the last 168 hours.  Prealbumin (Last 3 Results): No results for input(s): PREALBUMIN in the last 168 hours.  No results for input(s): COLORU, CLARITYU, SPECGRAV, PHUR, PROTEINUA, GLUCOSEU, BILIRUBINCON, BLOODU, WBCU, RBCU, BACTERIA, MUCUS, NITRITE, LEUKOCYTESUR, UROBILINOGEN, HYALINECASTS in the last 168 hours.  All pertinent lab results within the last 24 hours have been reviewed.     Significant Diagnostics:  I have reviewed all pertinent imaging results/findings within the past 24 hours.

## 2022-08-30 NOTE — PT/OT/SLP EVAL
Occupational Therapy   Co-Evaluation, Co-Treat and Discharge Note  Co-treatment with PT for maximal pt participation, safety, and activity tolerance     Name: Art Carrillo  MRN: [id number]  Admitting Diagnosis:  S/P colostomy takedown   Recent Surgery: Procedure(s) (LRB):  CLOSURE, ILEOSTOMY, ERAS low (N/A)  SIGMOIDOSCOPY, FLEXIBLE (N/A) 1 Day Post-Op    Recommendations:     Discharge Recommendations: home  Discharge Equipment Recommendations:  none  Barriers to discharge:       Assessment:     Art Carrillo is a 47 y.o. male with a medical diagnosis of S/P colostomy takedown. At this time, patient is functioning at their prior level of function and does not require further acute OT services.     Plan:     During this hospitalization, patient does not require further acute OT services.  Please re-consult if situation changes.    Plan of Care Reviewed with: patient    Subjective     Chief Complaint: none stated  Patient/Family Comments/goals: return home     Occupational Profile:  Living Environment: Pt lives with his spouse and 3 kids in a [facility] with 3 IZZY. Pt has a R HR. Pt has a shower/tub combo for bathing.  Previous level of function: I with ADLs and mobility  Roles and Routines: Pt on disability; enjoys spending time with children (involved in roles as father); drives  Equipment Used at home:  none  Assistance upon Discharge: Spouse    Pain/Comfort:  Pain Rating 1: 0/10  Pain Rating Post-Intervention 1: 0/10  Pain Rating Post-Intervention 2: 0/10    Patients cultural, spiritual, Latter day conflicts given the current situation: no    Objective:     Communicated with: RN prior to session.  Patient found HOB elevated with   upon OT entry to room.    General Precautions: Standard, fall   Orthopedic Precautions:N/A   Braces: N/A  Respiratory Status: Room air     Occupational Performance:    Bed Mobility:    NT as pt [facility]    Functional Mobility/Transfers:  Patient completed Sit <> Stand Transfer with  independence  with  no assistive device   Functional Mobility: Pt demonstrated functional mobility training to simulate household and community environment gait training during session. Pt tolerated ~8 minutes total using no AD with no LOB and good visual search and navigation strategies.       Activities of Daily Living:  Upper Body Dressing: independence donning danae shirt   Lower Body Dressing: independence donning tennis shoes     Cognitive/Visual Perceptual:  Cognitive/Psychosocial Skills:     -       Oriented to: Person, Place, Time, and Situation   -       Follows Commands/attention:Follows multistep  commands  -       Communication: clear/fluent  -       Memory: No Deficits noted  -       Safety awareness/insight to disability: intact   -       Mood/Affect/Coping skills/emotional control: Pleasant    Physical Exam:  Balance:    -       demo good sitting and standing balance using no AD   Upper Extremity Range of Motion:     -       Right Upper Extremity: WNL  -       Left Upper Extremity: WNL  Upper Extremity Strength:    -       Right Upper Extremity: WNL  -       Left Upper Extremity: WNL   Strength:    -       Right Upper Extremity: WNL  -       Left Upper Extremity: WNL    AMPAC 6 Click ADL:  AMPAC Total Score: 24    Treatment & Education:  Pt educated on role of occupational therapy, POC, and safety during ADLs and functional mobility. Pt and OT discussed importance of safe, continued mobility to optimize daily living skills. Pt verbalized understanding. White board updated during session. Pt given instruction to call for medical staff/nurse for assistance.       Patient left up in chair with all lines intact, call button in reach, RN notified, and pt's spouse present    GOALS:   Multidisciplinary Problems       Occupational Therapy Goals          Problem: Occupational Therapy    Goal Priority Disciplines Outcome Interventions   Occupational Therapy Goal     OT, PT/OT     Description:                           History:     Past Medical History:   Diagnosis Date    Rectal cancer     [id number]         Past Surgical History:   Procedure Laterality Date    FLEXIBLE SIGMOIDOSCOPY N/A 6/27/2022    Procedure: SIGMOIDOSCOPY, FLEXIBLE;  Surgeon: BROOKLYN Lock MD;  Location: [facility] OR 2ND FLR;  Service: Colon and Rectal;  Laterality: N/A;    FLEXIBLE SIGMOIDOSCOPY N/A 8/29/2022    Procedure: SIGMOIDOSCOPY, FLEXIBLE;  Surgeon: BROOKLYN Lock MD;  Location: [facility] OR 2ND FLR;  Service: Colon and Rectal;  Laterality: N/A;    ILEOSTOMY N/A 6/27/2022    Procedure: CREATION, ILEOSTOMY;  Surgeon: BROOKLYN Lock MD;  Location: [facility] OR 2ND FLR;  Service: Colon and Rectal;  Laterality: N/A;    ILEOSTOMY CLOSURE N/A 8/29/2022    Procedure: CLOSURE, ILEOSTOMY, ERAS low;  Surgeon: BROOKLYN Lock MD;  Location: [facility] OR 2ND FLR;  Service: Colon and Rectal;  Laterality: N/A;    INSERTION OF URETERAL CATHETER Bilateral 6/27/2022    Procedure: INSERTION, CATHETER, URETER;  Surgeon: Edgardo Rdz MD;  [facility];  Service: Urology;  Laterality: Bilateral;    LAPAROSCOPIC GASTRIC BANDING  10/2009    LAPAROSCOPIC SURGICAL REMOVAL OF OMENTUM N/A 6/27/2022    Procedure: RESECTION, OMENTUM, LAPAROSCOPIC;  Surgeon: BROOKLYN Lock MD;  [facility]: [facility];  Service: Colon and Rectal;  Laterality: N/A;    MOBILIZATION OF SPLENIC FLEXURE N/A 6/27/2022    Procedure: MOBILIZATION, SPLENIC FLEXURE;  Surgeon: BROOKLYN Lock MD;  [facility];  Service: Colon and Rectal;  Laterality: N/A;    PARASTOMAL HERNIA REPAIR  03/2021    ROBOT-ASSISTED LOW ANTERIOR RESECTION OF COLON      URETHRAL CATHETERIZATION  6/27/2022    Procedure: CATHETERIZATION, URETHRA;  Surgeon: Edgardo Rdz MD;  [facility]: [facility];  Service: Urology;;       Time Tracking:     OT Date of Treatment: 08/30/22  OT Start Time: 0834  OT Stop Time: 0851  OT Total Time (min): 17 min    Billable  Minutes:Evaluation 8 min  Self Care/Home Management 9 min    8/30/2022

## 2022-08-30 NOTE — PLAN OF CARE
[address] [place] [facility]  Initial Discharge Assessment       Primary Care Provider: TIFFANI Sampson    Admission Diagnosis: Ileostomy in place [Z93.2]    Admission Date: 8/29/2022  Expected Discharge Date: 8/31/2022    Discharge Barriers Identified: None    Payor: [employer] MANAGED MEDICARE / Plan: [employer] MEDICARE PPO / Product Type: Medicare Advantage /     Extended Emergency Contact Information  Primary Emergency Contact: Rosanna Carrillo  Mobile Phone: [phone]  Relation: Spouse    Discharge Plan A: Home  Discharge Plan B: Home Health      [id number] - [place], [place] - [address]  [address]  [place] [address]  Phone: [phone] Fax: [phone]      Initial Assessment (most recent)       Adult Discharge Assessment - 08/30/22 1042          Discharge Assessment    Assessment Type Discharge Planning Assessment     Confirmed/corrected address, phone number and insurance Yes     Confirmed Demographics Correct on Facesheet     Source of Information patient     Communicated CURT with patient/caregiver Yes     Lives With spouse     Do you expect to return to your current living situation? Yes     Do you have help at home or someone to help you manage your care at home? Yes     Prior to hospitilization cognitive status: Alert/Oriented     Current cognitive status: Alert/Oriented     Walking or Climbing Stairs Difficulty none     Dressing/Bathing Difficulty none     Equipment Currently Used at Home none     Readmission within 30 days? No     Do you currently have service(s) that help you manage your care at home? No     Do you take prescription medications? Yes     Do you have prescription coverage? Yes     Do you have any problems affording any of your prescribed medications? No     Is the patient taking medications as prescribed? yes     Who is going to help you get home at discharge? Wife     Are you on dialysis? No     Do you take coumadin? No     Discharge Plan A Home     Discharge Plan B Home Health      DME Needed Upon Discharge  none     Discharge Plan discussed with: Patient     Discharge Barriers Identified None                      Wendi Santoro RN, CM   Ext: [phone]

## 2022-08-30 NOTE — CARE UPDATE
"RAPID RESPONSE NURSE CHART REVIEW        Chart Reviewed: 08/30/2022, 11:15 AM    MRN: [id number]  Bed: 1002/1002 A    Dx: S/P colostomy takedown    Art Carrillo has a past medical history of Rectal cancer.    Last VS: BP (!) 94/55 (Patient Position: Lying)   Pulse (!) 52   Temp 96.4 °F (35.8 °C) (Oral)   Resp 18   Ht 6' 3" (1.905 m)   Wt 129.3 kg (285 lb)   SpO2 95%   BMI 35.62 kg/m²     24H Vital Sign Range:  Temp:  [96.1 °F (35.6 °C)-98.3 °F (36.8 °C)]   Pulse:  [51-68]   Resp:  [6-20]   BP: ()/(54-76)   SpO2:  [94 %-100 %]     Level of Consciousness (AVPU): responds to voice    Recent Labs     08/30/22  0311   WBC 9.74   HGB 13.8*   HCT 41.9          Recent Labs     08/30/22  0311      K 4.5      CO2 23   CREATININE 0.9           No results for input(s): PH, PCO2, PO2, HCO3, POCSATURATED, BE in the last 72 hours.     OXYGEN:    O2 Device (Oxygen Therapy): room air    MEWS score: 4    Bedside RN, An  contacted for bradycardia and hypotension. No additional concerns verbalized at this time. Instructed to call [phone] for further concerns or assistance.    Adeline New RN        "

## 2022-08-30 NOTE — ASSESSMENT & PLAN NOTE
Art Carrillo is a 47 y.o. male  w/ PMHx including rectal cancer w/ LAR and colostomy now s/p colostomy takedown 8/29/22     - Pain control: multimodal  - Home medications as appropriate  - Encourage IS, acapella  - Diet: LRD  - d/c mIVF  - PPX: lovenox  - OOBTC, encourage ambulation    Dispo: Continue inpatient care, ERAS pathway

## 2022-08-30 NOTE — PROGRESS NOTES
[facility]  Colorectal Surgery  Progress Note    Patient Name: Art Carrillo  MRN: [id number]  Admission Date: 8/29/2022  Hospital Length of Stay: 1 days  Attending Physician: BROOKLYN Lock MD    Subjective:     Interval History: Pt had a colostomy  takedown yesterday, no intraoperative complications. No acute overnight events. Tolerating CLD, denies N/V. Passing flatus, no BM yet. Pain is well controlled.    Post-Op Info:  Procedure(s) (LRB):  CLOSURE, ILEOSTOMY, ERAS low (N/A)  SIGMOIDOSCOPY, FLEXIBLE (N/A)   1 Day Post-Op      Medications:  Continuous Infusions:  Scheduled Meds:   acetaminophen  1,000 mg Intravenous Q8H    Followed by    acetaminophen  1,000 mg Oral Q8H    alvimopan  12 mg Oral BID    enoxaparin  40 mg Subcutaneous Daily    gabapentin  300 mg Oral TID    ibuprofen  800 mg Intravenous Q8H    Followed by    ibuprofen  800 mg Oral Q8H    mupirocin   Nasal BID     PRN Meds:   ondansetron    oxyCODONE    oxyCODONE    sodium chloride 0.9%    traMADoL        Objective:     Vital Signs (Most Recent):  Temp: 97.6 °F (36.4 °C) (08/30/22 0437)  Pulse: (!) 59 (08/30/22 0437)  Resp: 18 (08/30/22 0437)  BP: 133/74 (08/30/22 0437)  SpO2: 96 % (08/30/22 0437)   Vital Signs (24h Range):  Temp:  [96.1 °F (35.6 °C)-98.3 °F (36.8 °C)] 97.6 °F (36.4 °C)  Pulse:  [51-68] 59  Resp:  [6-20] 18  SpO2:  [94 %-100 %] 96 %  BP: (103-133)/(54-76) 133/74     Intake/Output - Last 3 Shifts         08/28 0700 08/29 0659 08/29 0700 08/30 0659    I.V. (mL/kg)  627.3 (4.9)    IV Piggyback  197.6    Total Intake(mL/kg)  824.9 (6.4)    Net  +824.9          Urine Occurrence  1 x    Stool Occurrence  0 x            Physical Exam  Constitutional:       General: He is not in acute distress.     Appearance: Normal appearance.   HENT:      Mouth/Throat:      Mouth: Mucous membranes are moist.   Eyes:      Extraocular Movements: Extraocular movements intact.   Cardiovascular:      Rate and Rhythm: Normal rate and regular  rhythm.   Pulmonary:      Effort: Pulmonary effort is normal. No respiratory distress.   Abdominal:      General: There is no distension.      Palpations: Abdomen is soft.      Tenderness: There is abdominal tenderness (incisional, appropriate postop). There is no guarding.      Comments: Previous ostomy site with packing and gauze on top   Skin:     General: Skin is warm and dry.   Neurological:      Mental Status: He is alert and oriented to person, place, and time. Mental status is at baseline.       Significant Labs:  CBC (Last 3 Results):   Recent Labs   Lab 08/30/22  0311   WBC 9.74   RBC 4.96   HGB 13.8*   HCT 41.9      MCV 85   MCH 27.8   MCHC 32.9     CMP (Last 3 Results):   Recent Labs   Lab 08/30/22  0311      CALCIUM 8.4*      K 4.5   CO2 23      BUN 11   CREATININE 0.9     CRP (Last 3 Results): No results for input(s): CRP in the last 168 hours.  Coagulation: No results for input(s): LABPROT, INR, APTT in the last 168 hours.  Prealbumin (Last 3 Results): No results for input(s): PREALBUMIN in the last 168 hours.  No results for input(s): COLORU, CLARITYU, SPECGRAV, PHUR, PROTEINUA, GLUCOSEU, BILIRUBINCON, BLOODU, WBCU, RBCU, BACTERIA, MUCUS, NITRITE, LEUKOCYTESUR, UROBILINOGEN, HYALINECASTS in the last 168 hours.  All pertinent lab results within the last 24 hours have been reviewed.     Significant Diagnostics:  I have reviewed all pertinent imaging results/findings within the past 24 hours.    Assessment/Plan:     * S/P colostomy takedown  Art Carrillo is a 47 y.o. male  w/ PMHx including rectal cancer w/ LAR and colostomy now s/p colostomy takedown 8/29/22     - Pain control: multimodal  - Home medications as appropriate  - Encourage IS, acapella  - Diet: LRD  - d/c mIVF  - PPX: lovenox  - OOBTC, encourage ambulation    Dispo: Continue inpatient care, ERAS pathway            Linden Mares MD  Colorectal Surgery  [facility]

## 2022-08-30 NOTE — HOSPITAL COURSE
Art Carrillo is a 47 y.o. male with Hx of rectal cancer with LAR and colostomy now s/p colostomy takedown on 8/29/22. No intraoperative complications. Patient remained on ERAS pathway. Was started on clear liquid diet and advanced as tolerated, currently on a regular diet. Pain is well controlled. Patient is ambulating. Passing gas and BM. Previous ostomy site was packed and has since been removed and covered with gauze.    Physical Exam  Constitutional:       General: No acute distress.     Appearance: Well-developed.   HENT:      Head: Normocephalic and atraumatic.   Eyes:      General:         Right eye: No discharge.         Left eye: No discharge.      Conjunctiva/sclera: Conjunctivae normal.   Neck:      Musculoskeletal: Normal range of motion.   Cardiovascular:      Rate and Rhythm: Normal rate and regular rhythm.   Pulmonary:      Effort: Pulmonary effort is normal. No respiratory distress.      Breath sounds: No stridor. No wheezing or rales.   Abdominal:      General: There is no distension.      Palpations: Abdomen is soft.      Tenderness: There is abdominal tenderness (appropriate postop).      Comments: Ostomy wound with minimal drainage, dressing replaced  Musculoskeletal: Normal range of motion.         General: No deformity.   Skin:     General: Skin is warm and dry.   Neurological:      Mental Status: Alert and oriented to person, place, and time.   Psychiatric:         Behavior: Behavior normal.

## 2022-08-31 ENCOUNTER — PATIENT MESSAGE (OUTPATIENT)
Dept: SURGERY | Facility: CLINIC | Age: 47
End: 2022-08-31
Payer: MEDICARE

## 2022-08-31 VITALS
HEART RATE: 74 BPM | DIASTOLIC BLOOD PRESSURE: 74 MMHG | SYSTOLIC BLOOD PRESSURE: 123 MMHG | BODY MASS INDEX: 35.43 KG/M2 | HEIGHT: 75 IN | RESPIRATION RATE: 16 BRPM | TEMPERATURE: 98 F | OXYGEN SATURATION: 96 % | WEIGHT: 285 LBS

## 2022-08-31 LAB — CRP SERPL-MCNC: 58.1 MG/L (ref 0–8.2)

## 2022-08-31 PROCEDURE — 86140 C-REACTIVE PROTEIN: CPT | Performed by: STUDENT IN AN ORGANIZED HEALTH CARE EDUCATION/TRAINING PROGRAM

## 2022-08-31 PROCEDURE — 25000003 PHARM REV CODE 250: Performed by: COLON & RECTAL SURGERY

## 2022-08-31 PROCEDURE — 25000003 PHARM REV CODE 250: Performed by: STUDENT IN AN ORGANIZED HEALTH CARE EDUCATION/TRAINING PROGRAM

## 2022-08-31 PROCEDURE — 36415 COLL VENOUS BLD VENIPUNCTURE: CPT | Performed by: STUDENT IN AN ORGANIZED HEALTH CARE EDUCATION/TRAINING PROGRAM

## 2022-08-31 RX ORDER — OXYCODONE HYDROCHLORIDE 5 MG/1
5 TABLET ORAL EVERY 4 HOURS PRN
Qty: 12 TABLET | Refills: 0 | Status: SHIPPED | OUTPATIENT
Start: 2022-08-31

## 2022-08-31 RX ADMIN — GABAPENTIN 300 MG: 300 CAPSULE ORAL at 08:08

## 2022-08-31 RX ADMIN — ALVIMOPAN 12 MG: 12 CAPSULE ORAL at 08:08

## 2022-08-31 NOTE — NURSING
Gave patient discharge instructions. All questions answered. Patient verbalized understanding of d/c instructions. Removed all patient's IV per order. Medication received from pharmacy. Patient took home all patient belongings. Patient ambulated to family vehicle. Pt tolerated well.

## 2022-08-31 NOTE — DISCHARGE SUMMARY
[facility]  Colorectal Surgery  Discharge Summary      Patient Name: Art Carrillo  MRN: [id number]  Admission Date: 8/29/2022  Hospital Length of Stay: 2 days  Discharge Date and Time: 8/31/2022  2:46 PM  Attending Physician: Mari att. providers found   Discharging Provider: Montana Elizondo MD  Primary Care Provider: TIFFANI Sampson     HPI:  Diagnosed with rectal cancer in May 2018.  He initially presented with diarrhea.  He was obstructed at the time of his initial colonoscopy and was therefore treated with emergent laparoscopic loop colostomy.  He then underwent neoadjuvant chemo radiotherapy as well as systemic chemotherapy.  He was sent to the [facility] to have a low anterior resection performed.  Robotic low anterior resection was performed 10/2018.  At that time, anastomosis was not performed due to difficulty getting the colon to reach.  He was therefore given an end colostomy with a Saima stump.  He had additional adjuvant chemotherapy.     From a tumor standpoint, he has done well.  He is followed by [facility] for oncology.  Recent tumor markers and CT scans negative for metastatic or recurrent disease.  In March 2021, he developed peristomal hernia.  Laparoscopic parastomal hernia repair was performed.  At that time, his pelvis was noted to be scarred and the rectal stump unable to be identified.  Therefore, keyhole repair with a mesh was done laparoscopically.     He has a history of a prior laparoscopic gastric band with resultant 300 lb weight loss.  He has no open abdominal incisions.  No lower abdominal incisions.  His understanding of his low anterior resection was that the surgeon took out a proximally 5-8 inches of the rectum.     Historically, he had bowel movements twice per day.  No issues with fecal incontinence.  No tobacco abuse.  His weight has been stable over the past several months.      Procedure(s) (LRB):  CLOSURE, ILEOSTOMY, ERAS low (N/A)  SIGMOIDOSCOPY,  FLEXIBLE (N/A)     Hospital Course:  Art Carrillo is a 47 y.o. male with Hx of rectal cancer with LAR and colostomy now s/p colostomy takedown on 8/29/22. No intraoperative complications. Patient remained on ERAS pathway. Was started on clear liquid diet and advanced as tolerated, currently on a regular diet. Pain is well controlled. Patient is ambulating. Passing gas and BM. Previous ostomy site was packed and has since been removed and covered with gauze.    Physical Exam  Constitutional:       General: No acute distress.     Appearance: Well-developed.   HENT:      Head: Normocephalic and atraumatic.   Eyes:      General:         Right eye: No discharge.         Left eye: No discharge.      Conjunctiva/sclera: Conjunctivae normal.   Neck:      Musculoskeletal: Normal range of motion.   Cardiovascular:      Rate and Rhythm: Normal rate and regular rhythm.   Pulmonary:      Effort: Pulmonary effort is normal. No respiratory distress.      Breath sounds: No stridor. No wheezing or rales.   Abdominal:      General: There is no distension.      Palpations: Abdomen is soft.      Tenderness: There is abdominal tenderness (appropriate postop).      Comments: Ostomy wound with minimal drainage, dressing replaced  Musculoskeletal: Normal range of motion.         General: No deformity.   Skin:     General: Skin is warm and dry.   Neurological:      Mental Status: Alert and oriented to person, place, and time.   Psychiatric:         Behavior: Behavior normal.         Goals of Care Treatment Preferences:             Significant Diagnostic Studies: Labs:   CMP   Recent Labs   Lab 08/30/22  0311      K 4.5      CO2 23      BUN 11   CREATININE 0.9   CALCIUM 8.4*   ANIONGAP 7*    and CBC   Recent Labs   Lab 08/30/22  0311   WBC 9.74   HGB 13.8*   HCT 41.9          Pending Diagnostic Studies:     Procedure Component Value Units Date/Time    Specimen to Pathology, Surgery General Surgery [[id number]]  Collected: 08/29/22 1110    Order Status: Sent Lab Status: In process Updated: 08/31/22 0434    Specimen: Tissue         Final Active Diagnoses:    Diagnosis Date Noted POA    PRINCIPAL PROBLEM:  S/P colostomy takedown [Z98.890] 08/30/2022 Not Applicable      Problems Resolved During this Admission:      Discharged Condition: good    Disposition: Home or Self Care    Follow Up:   Follow-up Information     MICHELET Lock MD Follow up on 9/13/2022.    Specialty: Colon and Rectal Surgery  Why: Postop Visit at 1:20PM  Contact information:  [address] [facility] [address]  [phone]                       Patient Instructions:      Diet Adult Regular     Notify your health care provider if you experience any of the following:  temperature >100.4     Notify your health care provider if you experience any of the following:  persistent nausea and vomiting or diarrhea     Notify your health care provider if you experience any of the following:  severe uncontrolled pain     Notify your health care provider if you experience any of the following:  redness, tenderness, or signs of infection (pain, swelling, redness, odor or green/yellow discharge around incision site)     Notify your health care provider if you experience any of the following:  difficulty breathing or increased cough     Notify your health care provider if you experience any of the following:  severe persistent headache     Notify your health care provider if you experience any of the following:  persistent dizziness, light-headedness, or visual disturbances     Notify your health care provider if you experience any of the following:  increased confusion or weakness     Activity as tolerated     Medications:  Reconciled Home Medications:      Medication List      CHANGE how you take these medications    oxyCODONE 5 MG immediate release tablet  Commonly known as: ROXICODONE  Take 1 tablet (5 mg total) by mouth every 4 (four) hours as needed  for Pain.  What changed:   · medication strength  · how much to take        CONTINUE taking these medications    pregabalin 75 MG capsule  Commonly known as: LYRICA  Take 75 mg by mouth.        ASK your doctor about these medications    gabapentin 300 MG capsule  Commonly known as: NEURONTIN  Take 1 capsule (300 mg total) by mouth 3 (three) times daily.            Montana Elizondo MD  Colorectal Surgery  [facility]

## 2022-08-31 NOTE — HPI
Diagnosed with rectal cancer in May 2018.  He initially presented with diarrhea.  He was obstructed at the time of his initial colonoscopy and was therefore treated with emergent laparoscopic loop colostomy.  He then underwent neoadjuvant chemo radiotherapy as well as systemic chemotherapy.  He was sent to the [facility] to have a low anterior resection performed.  Robotic low anterior resection was performed 10/2018.  At that time, anastomosis was not performed due to difficulty getting the colon to reach.  He was therefore given an end colostomy with a Saima stump.  He had additional adjuvant chemotherapy.     From a tumor standpoint, he has done well.  He is followed by [facility] for oncology.  Recent tumor markers and CT scans negative for metastatic or recurrent disease.  In March 2021, he developed peristomal hernia.  Laparoscopic parastomal hernia repair was performed.  At that time, his pelvis was noted to be scarred and the rectal stump unable to be identified.  Therefore, keyhole repair with a mesh was done laparoscopically.     He has a history of a prior laparoscopic gastric band with resultant 300 lb weight loss.  He has no open abdominal incisions.  No lower abdominal incisions.  His understanding of his low anterior resection was that the surgeon took out a proximally 5-8 inches of the rectum.     Historically, he had bowel movements twice per day.  No issues with fecal incontinence.  No tobacco abuse.  His weight has been stable over the past several months.

## 2022-08-31 NOTE — PLAN OF CARE
[facility]  Discharge Final Note    Primary Care Provider: TIFFANI Sampson    Expected Discharge Date: 8/31/2022    Final Discharge Note (most recent)       Final Note - 08/31/22 1036          Final Note    Assessment Type Final Discharge Note     Anticipated Discharge Disposition Home or Self Care     Hospital Resources/Appts/Education Provided Appointments scheduled and added to AVS        Post-Acute Status    Post-Acute Authorization Other     Other Status No Post-Acute Service Needs                   Contact Daksha Lock MD   Specialty: Colon and Rectal Surgery    [address] [place] [id number]   Phone: [phone]       Next Steps: Follow up on 9/13/2022    Instructions: Postop Visit at 1:20PM          Wendi Santoro RN, CM   Ext: [phone]

## 2022-08-31 NOTE — PLAN OF CARE
END OF SHIFT NOTE  Pt rested well throughout shift, no distress at this time, no complaints, VSS, ambulatory, no complaints of pain, up in chair throughout night, possible d/c today, ,personal items within reach. Call light within reach, [facility]      HUI Rivera RN         Problem: Adult Inpatient Plan of Care  Goal: Plan of Care Review  Outcome: Ongoing, Progressing  Goal: Patient-Specific Goal (Individualized)  Outcome: Ongoing, Progressing  Goal: Absence of Hospital-Acquired Illness or Injury  Outcome: Ongoing, Progressing  Goal: Optimal Comfort and Wellbeing  Outcome: Ongoing, Progressing  Goal: Readiness for Transition of Care  Outcome: Ongoing, Progressing     Problem: Infection  Goal: Absence of Infection Signs and Symptoms  Outcome: Ongoing, Progressing     Problem: Fall Injury Risk  Goal: Absence of Fall and Fall-Related Injury  Outcome: Ongoing, Progressing

## 2022-09-07 LAB
FINAL PATHOLOGIC DIAGNOSIS: NORMAL
Lab: NORMAL

## 2022-09-12 ENCOUNTER — TELEPHONE (OUTPATIENT)
Dept: SURGERY | Facility: CLINIC | Age: 47
End: 2022-09-12
Payer: MEDICARE

## 2022-09-22 NOTE — PROGRESS NOTES
CRS Office Post Operative Visit    SUBJECTIVE:     Chief Complaint: followup from surgery.     Procedure:   6/27/22  1.  Laparoscopic colostomy takedown with splenic flexure mobilization and stapled end-to-side colorectal anastomosis  2.  Redo pelvic dissection  3.  Laparoscopic omentectomy  4.  Diverting loop ileostomy.  5.  Flexible sigmoidoscopy.    8/29/22: ileostomy closure     Indication:   Diagnosed with rectal cancer in May 2018.  He initially presented with diarrhea.  He was obstructed at the time of his initial colonoscopy and was therefore treated with emergent laparoscopic loop colostomy.  He then underwent neoadjuvant chemo radiotherapy as well as systemic chemotherapy.  He was sent to the [facility] to have a low anterior resection performed.  Robotic low anterior resection was performed 10/2018.  At that time, anastomosis was not performed due to difficulty getting the colon to reach.  He was therefore given an end colostomy with a Saima stump.  He had additional adjuvant chemotherapy.     From a tumor standpoint, he has done well.  He is followed by [facility] for oncology.  Recent tumor markers and CT scans negative for metastatic or recurrent disease.  In March 2021, he developed peristomal hernia.  Laparoscopic parastomal hernia repair was performed.  At that time, his pelvis was noted to be scarred and the rectal stump unable to be identified.  Therefore, keyhole repair with a mesh was done laparoscopically.     He has a history of a prior laparoscopic gastric band with resultant 300 lb weight loss.  He has no open abdominal incisions.  No lower abdominal incisions.  His understanding of his low anterior resection was that the surgeon took out a proximally 5-8 inches of the rectum.     Historically, he had bowel movements twice per day.  No issues with fecal incontinence.  No tobacco abuse.  His weight has been stable over the past several months.    Pathology:   A.   OMENTUM,  REMOVAL:          -Benign adipose tissue with focal serosal adhesions.   B.   COLOSTOMY, COLOSTOMY CLOSURE:          -Benign skin and colonic parenchyma with no pathologic abnormalities,   consistent with colostomy.          -One(1) benign reactive lymph node.   C.   PROXIMAL ANASTOMOTIC DONUT, REMOVAL:          -Benign colonic parenchyma with no pathologic abnormalities,   consistent with anastomotic donut.   D.   DISTAL ANASTOMOTIC DONUT, REMOVAL:          -Benign colonic parenchyma with no pathologic abnormalities,   consistent with anastomotic donut.    Current Status:   7/12/22:  Presents for postoperative visit.  Eating.  Having intermittent difficulty with his ileostomy.  Pain well controlled.  No issues with his Pfannenstiel incision.  Intermittent serous drainage from his prior colostomy site.  8/16/22:  Presents after his Gastrografin enema was done demonstrating no evidence of anastomotic leak.  He is overall doing well.  Had 1 episode of overflow from his ileostomy was able to have good continence.  9/23/22:  Presents for follow-up.  Having 2 bowel movements per day.  No issues with fecal incontinence.  One episode of fecal urgency with liquid bowel movements.  Continues to take Imodium 2 tablets in the morning and at night.  Overall pleased with his result.  No issues with his ileostomy takedown site.    Review of Systems:  Review of Systems   Constitutional:  Negative for chills, diaphoresis, fever, malaise/fatigue and weight loss.   HENT:  Negative for congestion.    Respiratory:  Negative for shortness of breath.    Cardiovascular:  Negative for chest pain and leg swelling.   Gastrointestinal:  Positive for diarrhea. Negative for abdominal pain, blood in stool, constipation, nausea and vomiting.   Genitourinary:  Negative for dysuria.   Musculoskeletal:  Negative for back pain and myalgias.   Skin:  Negative for rash.   Neurological:  Negative for dizziness and weakness.   Endo/Heme/Allergies:  Does  "not bruise/bleed easily.   Psychiatric/Behavioral:  Negative for depression.      OBJECTIVE:     Vital Signs (Most Recent)  BP (!) 141/85 (BP Location: Left arm, Patient Position: Sitting, BP Method: Large (Automatic))   Pulse 60   Ht 6' 3" (1.905 m)   Wt 135.4 kg (298 lb 8.1 oz)   BMI 37.31 kg/m²     Physical Exam:  General: White male in no distress   Respiratory: respirations are even and unlabored  Cardiac: regular rate  Abdomen:  Colostomy site in the left upper quadrant is healing well.  A small amount of serous fluid drained.  Pfannenstiel incision completely healed.  Ileostomy in the right mid abdomen is pink.  Liquid stool in the appliance.  Extremities: Warm dry and intact  Anorectal:  Deferred    ASSESSMENT/PLAN:     Art was seen today for post-op evaluation.    Diagnoses and all orders for this visit:    Ileostomy in place        47 y.o. male post op from laparoscopic redo pelvic dissection with colostomy takedown an low colorectal anastomosis on 06/27/2022.  Ileostomy closured on 8/29/22.  He is overall doing very well.  He can follow up with me with any future concerns or issues.  Routine cancer care per his oncologist.      BROOKLYN Lock MD, FACS  Staff Surgeon  Colon & Rectal Surgery              "

## 2022-09-23 ENCOUNTER — OFFICE VISIT (OUTPATIENT)
Dept: SURGERY | Facility: CLINIC | Age: 47
End: 2022-09-23
Payer: MEDICARE

## 2022-09-23 VITALS
WEIGHT: 298.5 LBS | SYSTOLIC BLOOD PRESSURE: 141 MMHG | DIASTOLIC BLOOD PRESSURE: 85 MMHG | HEART RATE: 60 BPM | BODY MASS INDEX: 37.12 KG/M2 | HEIGHT: 75 IN

## 2022-09-23 DIAGNOSIS — Z93.2 ILEOSTOMY IN PLACE: Primary | ICD-10-CM

## 2022-09-23 PROCEDURE — 99999 PR PBB SHADOW E&M-EST. PATIENT-LVL III: ICD-10-PCS | Mod: PBBFAC,,, | Performed by: COLON & RECTAL SURGERY

## 2022-09-23 PROCEDURE — 99999 PR PBB SHADOW E&M-EST. PATIENT-LVL III: CPT | Mod: PBBFAC,,, | Performed by: COLON & RECTAL SURGERY

## 2022-09-23 PROCEDURE — 3079F PR MOST RECENT DIASTOLIC BLOOD PRESSURE 80-89 MM HG: ICD-10-PCS | Mod: CPTII,S$GLB,, | Performed by: COLON & RECTAL SURGERY

## 2022-09-23 PROCEDURE — 1160F PR REVIEW ALL MEDS BY PRESCRIBER/CLIN PHARMACIST DOCUMENTED: ICD-10-PCS | Mod: CPTII,S$GLB,, | Performed by: COLON & RECTAL SURGERY

## 2022-09-23 PROCEDURE — 1160F RVW MEDS BY RX/DR IN RCRD: CPT | Mod: CPTII,S$GLB,, | Performed by: COLON & RECTAL SURGERY

## 2022-09-23 PROCEDURE — 3079F DIAST BP 80-89 MM HG: CPT | Mod: CPTII,S$GLB,, | Performed by: COLON & RECTAL SURGERY

## 2022-09-23 PROCEDURE — 3077F SYST BP >= 140 MM HG: CPT | Mod: CPTII,S$GLB,, | Performed by: COLON & RECTAL SURGERY

## 2022-09-23 PROCEDURE — 1159F PR MEDICATION LIST DOCUMENTED IN MEDICAL RECORD: ICD-10-PCS | Mod: CPTII,S$GLB,, | Performed by: COLON & RECTAL SURGERY

## 2022-09-23 PROCEDURE — 99024 POSTOP FOLLOW-UP VISIT: CPT | Mod: S$GLB,,, | Performed by: COLON & RECTAL SURGERY

## 2022-09-23 PROCEDURE — 3008F PR BODY MASS INDEX (BMI) DOCUMENTED: ICD-10-PCS | Mod: CPTII,S$GLB,, | Performed by: COLON & RECTAL SURGERY

## 2022-09-23 PROCEDURE — 3008F BODY MASS INDEX DOCD: CPT | Mod: CPTII,S$GLB,, | Performed by: COLON & RECTAL SURGERY

## 2022-09-23 PROCEDURE — 99024 PR POST-OP FOLLOW-UP VISIT: ICD-10-PCS | Mod: S$GLB,,, | Performed by: COLON & RECTAL SURGERY

## 2022-09-23 PROCEDURE — 3077F PR MOST RECENT SYSTOLIC BLOOD PRESSURE >= 140 MM HG: ICD-10-PCS | Mod: CPTII,S$GLB,, | Performed by: COLON & RECTAL SURGERY

## 2022-09-23 PROCEDURE — 1159F MED LIST DOCD IN RCRD: CPT | Mod: CPTII,S$GLB,, | Performed by: COLON & RECTAL SURGERY

## 2025-04-10 NOTE — PT/OT/SLP EVAL
"Physical Therapy Co-Evaluation and Discharge Note  Co-evaluation with OT for maximal pt participation, safety, and activity tolerance    Patient Name:  Art Carrillo   MRN:  [id number]  Admitting Diagnosis:  S/P colostomy takedown   Recent Surgery: Procedure(s) (LRB):  CLOSURE, ILEOSTOMY, ERAS low (N/A)  SIGMOIDOSCOPY, FLEXIBLE (N/A) 1 Day Post-Op  Admit Date: 8/29/2022  Length of Stay: 1 days    Recommendations:     Discharge Recommendations:  home   Discharge Equipment Recommendations: none   Barriers to discharge: None    Assessment:     Art Carrillo is a 47 y.o. male admitted with a medical diagnosis of S/P colostomy takedown. .  At this time, patient is functioning at their prior level of function and does not require further acute PT services.     Highest level of mobility achieved this visit: 500ft independent ambulation and 1 flight of stairs (I)    Plan:     During this hospitalization, patient does not require further acute PT services.  Please re-consult if situation changes.      Subjective     RN An notified prior to session. Pt's wife present upon PT entrance into room.    Chief Complaint: none  Patient/Family Comments/goals: "Feel ready to go home"  Pain/Comfort:  Pain Rating 1: 0/10    Social History:  Residence: lives with their family 1-story house with 3 IZZY and R HR.  Support available: family  Equipment Used: none  Equipment Owned (not using): None  Prior level of function: independent  Work: Disability.   Drive: yes.     Objective:     Additional staff present: OT Denise    Patient found up in chair with  (no active lines) upon PT entry to room.    General Precautions: Standard,     Orthopedic Precautions:N/A   Braces: N/A   Body mass index is 35.62 kg/m².  Oxygen Device: Room Air  Vitals: /63 (BP Location: Left arm, Patient Position: Sitting)   Pulse 62   Temp 97.4 °F (36.3 °C) (Oral)   Resp 19   Ht 6' 3" (1.905 m)   Wt 129.3 kg (285 lb)   SpO2 98%   BMI 35.62 kg/m² "     Exam:  Cognition:   Alert and Cooperative  Command following: Follows multistep verbal commands  Fluency: clear/fluent  Skin Integrity: Visible skin intact  Edema: None noted  Hearing: Intact  Vision:  Intact  Coordination: grossly intact  Range of Motion:  RLE: WFL  LLE: WFL  Strength Exam:  Bilateral Lower Extremity Strength: grossly WFL    Outcome Measures:  AM-PAC 6 CLICK MOBILITY  Turning over in bed (including adjusting bedclothes, sheets and blankets)?: 4  Sitting down on and standing up from a chair with arms (e.g., wheelchair, bedside commode, etc.): 4  Moving from lying on back to sitting on the side of the bed?: 4  Moving to and from a bed to a chair (including a wheelchair)?: 4  Need to walk in hospital room?: 4  Climbing 3-5 steps with a railing?: 4  Basic Mobility Total Score: 24     Functional Mobility:  Bed Mobility:   Pt found/returned to bedside chair    Transfers:   Sit <> Stand Transfer: independence with no assistive device   Stand <> Sit Transfer: independence with no assistive device   [id number] from bedside chair    Standing Balance:  Static Standing Balance: Good : able to maintain standing balance against moderate resistance  Dynamic Standing Balance: Good : able to stand independently unsupported and cross midline moderately  Assistance Level Required: Sullivan  Patient used: no assistive device   Time: 10 min  Postural deviations noted: no deviations noted    Gait:   Patient ambulated: 500ft   Patient required: independent  Patient used:  no assistive device   Gait Pattern observed: swing-through gait  Gait Deviation(s): steady gait  Mask donned for out of room ambulation    Stairs:  Pt ascended/descended 1 flight(s) with No Assistive Device with right handrail with Independent.     Education:  Time provided for education, counseling and discussion of health disposition in regards to patient's current status  All questions answered within PT scope of practice and to patient's  satisfaction    Patient left up in chair with call button in reach and wife present.    GOALS:   Multidisciplinary Problems       Physical Therapy Goals       Not on file                    History:     Past Medical History:   Diagnosis Date    Rectal cancer     [id number]       Past Surgical History:   Procedure Laterality Date    FLEXIBLE SIGMOIDOSCOPY N/A 6/27/2022    Procedure: SIGMOIDOSCOPY, FLEXIBLE;  Surgeon: BROOKLYN Lock MD;  Location: [facility] OR 2ND FLR;  Service: Colon and Rectal;  Laterality: N/A;    FLEXIBLE SIGMOIDOSCOPY N/A 8/29/2022    Procedure: SIGMOIDOSCOPY, FLEXIBLE;  Surgeon: BROOKLYN Lock MD;  Location: [facility] OR 2ND FLR;  Service: Colon and Rectal;  Laterality: N/A;    ILEOSTOMY N/A 6/27/2022    Procedure: CREATION, ILEOSTOMY;  Surgeon: BROOKLYN Lock MD;  Location: [facility] OR 2ND FLR;  Service: Colon and Rectal;  Laterality: N/A;    ILEOSTOMY CLOSURE N/A 8/29/2022    Procedure: CLOSURE, ILEOSTOMY, ERAS low;  Surgeon: BROOKLYN Lock MD;  Location: [facility] OR [facility] FLR;  Service: Colon and Rectal;  Laterality: N/A;    INSERTION OF URETERAL CATHETER Bilateral 6/27/2022    Procedure: INSERTION, CATHETER, URETER;  Surgeon: Edgardo Rdz MD;  [facility];  Service: Urology;  Laterality: Bilateral;    LAPAROSCOPIC GASTRIC BANDING  10/2009    LAPAROSCOPIC SURGICAL REMOVAL OF OMENTUM N/A 6/27/2022    Procedure: RESECTION, OMENTUM, LAPAROSCOPIC;  Surgeon: BROOKLYN Lock MD;  Location: [facility] OR [facility] FLR;  Service: Colon and Rectal;  Laterality: N/A;    MOBILIZATION OF SPLENIC FLEXURE N/A 6/27/2022    Procedure: MOBILIZATION, SPLENIC FLEXURE;  Surgeon: BROOKLYN Lock MD;  Location: [facility] OR [facility] FLR;  Service: Colon and Rectal;  Laterality: N/A;    PARASTOMAL HERNIA REPAIR  03/2021    ROBOT-ASSISTED LOW ANTERIOR RESECTION OF COLON      URETHRAL CATHETERIZATION  6/27/2022    Procedure: CATHETERIZATION, URETHRA;  Surgeon: Edgardo Rdz MD;  Location: [facility] OR [facility]  FLR;  Service: Urology;;       Time Tracking:     PT Received On: 08/30/22  PT Start Time: 0834     PT Stop Time: 0850  PT Total Time (min): 16 min     Billable Minutes: Evaluation 1 procedure    Antione Parks PT, DPT  8/30/2022       Mr. Iain Owusu is 57M w/ HTN, HLD, carotid stenosis, CAD s/p CABG (2017), and PAD s/p BLE endovascular intervention by other providers at OSH (most recently 10/2024 at [place]), now presenting to [facility] for non-healing LLE wound just above medial malleolus. He developed this scan 5 months ago, but 4 days ago it peeled off and developed surrounding erythema. He denies any rest pain in his feet at this time. He is reportedly supposed to follow up with his vascular interventionalist soon and is possible offered another angiogram. On exam, he has LLE woudn above medial malleolar region. Has scab and some fibrinous tissue. No jesus purulence. Surrounding cellulitis. Non-contrast CT scan read as cellulitis. Faintly palpable L PT pulse. Good dopplerable L DP/PT signals. Afebrile and HDS. WBC 9.79. Cr 0.65.       ASSESSMENT  - PAD s/p BLE endovascular intervention by other providers at OSH (most recently 10/2024 at [place]), now presenting to [facility] for non-healing LLE wound just above medial malleolus w/ surrounding cellulitis --> hopefully improves with good local wound care and ABX      PLAN & RECOMMENDTIONS  - IV ABX, ID consult  - Local wound care with wet to dry dressings  - Vascular surgery will continue to follow. If exam changes or worsens will re-evaluate for angiogram +/- debridement      Thank you,    Umesh Whittaker MD   of Vascular Surgery  [facility] at [address] [place] [address]  Office: [phone]; Fax: [phone]  [email or web address]

## (undated) DEVICE — NDL BOX COUNTER

## (undated) DEVICE — TROCAR ENDOPATH XCEL 5MM 7.5CM

## (undated) DEVICE — SEE MEDLINE ITEM 156902

## (undated) DEVICE — BOWL STERILE LARGE 32OZ

## (undated) DEVICE — TUBING HF INSUFFLATION W/ FLTR

## (undated) DEVICE — SEALER LIGASURE IMPACT 18CM

## (undated) DEVICE — SUT 0 8-18 IN CTD VICRYL

## (undated) DEVICE — SET IRR URLGY 2LINE UNIV SPIKE

## (undated) DEVICE — SUT CTD VICRYL 2-0 VIL BR

## (undated) DEVICE — DRAPE INCISE IOBAN 2 23X17IN

## (undated) DEVICE — SUT 3-0 VICRYL SH CR/8 18

## (undated) DEVICE — KIT GELPORT LAPAROSCOPIC ABD

## (undated) DEVICE — DECANTER FLUID TRNSF WHITE 9IN

## (undated) DEVICE — MARKER SKIN STND TIP BLUE BARR

## (undated) DEVICE — DRAPE CORETEMP FLD WRM 56X62IN

## (undated) DEVICE — DRAPE ABDOMINAL TIBURON 14X11

## (undated) DEVICE — LUBRICANT SURGILUBE 2 OZ

## (undated) DEVICE — TRAY FOLEY 16FR INFECTION CONT

## (undated) DEVICE — TUBING SUCTION STERILE

## (undated) DEVICE — BANDAGE ADHESIVE PLAS STRL 1X3

## (undated) DEVICE — SYR IRRIGATION BULB STER 60ML

## (undated) DEVICE — CUTTER PROXIMATE BLUE 75MM

## (undated) DEVICE — SUT 1 48IN PDS II VIO MONO

## (undated) DEVICE — SYR ONLY LUER LOCK 20CC

## (undated) DEVICE — CATH IV INTROCAN 14G X 2.

## (undated) DEVICE — LEGGINGS 48X31 INCH

## (undated) DEVICE — PAD PINK TRENDELENBURG POS XL

## (undated) DEVICE — GAUZE SPONGE 4X4 12PLY

## (undated) DEVICE — TOWEL OR DISP STRL BLUE 4/PK

## (undated) DEVICE — TUBE PENROSE DRAIN 12IN X 5/8I

## (undated) DEVICE — SUT CTD VICRYL 3-0 VIL BR

## (undated) DEVICE — COVER LIGHT HANDLE 80/CA

## (undated) DEVICE — RELOAD PROXIMATE CUT BLUE 75MM

## (undated) DEVICE — TAPE MEDIPORE 3 X 10YD

## (undated) DEVICE — CLOSURE SKIN STERI STRIP 1/2X4

## (undated) DEVICE — ELECTRODE REM PLYHSV RETURN 9

## (undated) DEVICE — SUT COATED VICRYL 4/0 27IN

## (undated) DEVICE — SUT 3/0 27IN PDS II VIO MO

## (undated) DEVICE — DRESSING TRANS 4X4 TEGADERM

## (undated) DEVICE — SEE MEDLINE ITEM 157144

## (undated) DEVICE — KIT ANTIFOG W/SPONG & FLUID

## (undated) DEVICE — TROCAR ENDOPATH XCEL 5X75MM

## (undated) DEVICE — SUT 2-0 12-18IN SILK

## (undated) DEVICE — TIP YANKAUERS BULB NO VENT

## (undated) DEVICE — SEE MEDLINE ITEM 154981

## (undated) DEVICE — SUT CTD VICRYL VIL BR CR/SH

## (undated) DEVICE — TRAY CATH FOL SIL URIMTR 16FR

## (undated) DEVICE — SUT 0 18IN COATED VICRYL V

## (undated) DEVICE — CATH POLLACK OPEN-END FLEXI-TI

## (undated) DEVICE — WIRE ANGLED TIP SENSOR

## (undated) DEVICE — SOL IRR NACL .9% 3000ML

## (undated) DEVICE — Device

## (undated) DEVICE — SEALER LIGASURE LAP 37CM 5MM

## (undated) DEVICE — SALINE .45% 1000ML VITEK2

## (undated) DEVICE — STAPLER ECHELON PWR CIR 29MM

## (undated) DEVICE — CATH URETHRAL RED RUBBER 18FR